# Patient Record
Sex: FEMALE | Race: WHITE | Employment: OTHER | URBAN - METROPOLITAN AREA
[De-identification: names, ages, dates, MRNs, and addresses within clinical notes are randomized per-mention and may not be internally consistent; named-entity substitution may affect disease eponyms.]

---

## 2017-05-10 ENCOUNTER — HOSPITAL ENCOUNTER (OUTPATIENT)
Dept: WOMENS IMAGING | Age: 82
Discharge: HOME | End: 2017-05-10
Payer: MEDICARE

## 2017-05-10 DIAGNOSIS — Z13.9 SCREENING: ICD-10-CM

## 2017-05-10 PROCEDURE — 77063 BREAST TOMOSYNTHESIS BI: CPT

## 2018-10-07 ENCOUNTER — OFFICE VISIT (OUTPATIENT)
Dept: FAMILY MEDICINE CLINIC | Age: 83
End: 2018-10-07
Payer: MEDICARE

## 2018-10-07 VITALS
TEMPERATURE: 97.5 F | BODY MASS INDEX: 22.2 KG/M2 | SYSTOLIC BLOOD PRESSURE: 132 MMHG | OXYGEN SATURATION: 94 % | HEIGHT: 61 IN | WEIGHT: 117.6 LBS | DIASTOLIC BLOOD PRESSURE: 78 MMHG | HEART RATE: 64 BPM

## 2018-10-07 DIAGNOSIS — S39.92XA INJURY OF BACK, INITIAL ENCOUNTER: Primary | ICD-10-CM

## 2018-10-07 DIAGNOSIS — S29.012A MUSCLE STRAIN OF UPPER BACK: ICD-10-CM

## 2018-10-07 PROCEDURE — 99213 OFFICE O/P EST LOW 20 MIN: CPT | Performed by: NURSE PRACTITIONER

## 2018-10-07 RX ORDER — TIZANIDINE 2 MG/1
2 TABLET ORAL EVERY 8 HOURS PRN
Qty: 15 TABLET | Refills: 0 | Status: SHIPPED | OUTPATIENT
Start: 2018-10-07 | End: 2018-10-12

## 2018-10-07 ASSESSMENT — ENCOUNTER SYMPTOMS
FACIAL SWELLING: 0
BACK PAIN: 1
SORE THROAT: 0
EYE REDNESS: 0
SINUS PRESSURE: 0
CHEST TIGHTNESS: 0
SHORTNESS OF BREATH: 0
EYE DISCHARGE: 0
WHEEZING: 0
NAUSEA: 0
CONSTIPATION: 0
ABDOMINAL DISTENTION: 0
ABDOMINAL PAIN: 0
DIARRHEA: 0
COUGH: 0

## 2018-10-07 ASSESSMENT — PATIENT HEALTH QUESTIONNAIRE - PHQ9
1. LITTLE INTEREST OR PLEASURE IN DOING THINGS: 0
2. FEELING DOWN, DEPRESSED OR HOPELESS: 0
SUM OF ALL RESPONSES TO PHQ9 QUESTIONS 1 & 2: 0
SUM OF ALL RESPONSES TO PHQ QUESTIONS 1-9: 0
SUM OF ALL RESPONSES TO PHQ QUESTIONS 1-9: 0

## 2018-10-07 NOTE — PROGRESS NOTES
and back pain. Negative for gait problem and joint swelling. Skin: Negative. Neurological: Negative for dizziness, seizures, syncope, weakness, numbness and headaches. Psychiatric/Behavioral: Negative for agitation, behavioral problems, confusion and dysphoric mood. Objective:   /78 (Site: Left Upper Arm, Position: Sitting, Cuff Size: Medium Adult)   Pulse 64   Temp 97.5 °F (36.4 °C) (Tympanic)   Ht 5' 1\" (1.549 m)   Wt 117 lb 9.6 oz (53.3 kg)   SpO2 94%   BMI 22.22 kg/m²     Physical Exam   Constitutional: She is oriented to person, place, and time. She appears well-developed. HENT:   Head: Normocephalic and atraumatic. Mouth/Throat: Oropharynx is clear and moist.   Eyes: Pupils are equal, round, and reactive to light. Neck: No tracheal deviation present. No thyromegaly present. Cardiovascular: Normal rate, regular rhythm and normal heart sounds. Exam reveals no gallop. No murmur heard. Pulmonary/Chest: Effort normal and breath sounds normal. She has no wheezes. She has no rales. She exhibits no tenderness. Abdominal: Soft. Bowel sounds are normal. She exhibits no distension and no mass. There is no tenderness. There is no rebound and no guarding. Musculoskeletal: Normal range of motion. She exhibits tenderness. She exhibits no edema. Arms:  Lymphadenopathy:     She has no cervical adenopathy. Neurological: She is alert and oriented to person, place, and time. Coordination normal.   Skin: Skin is warm and dry. No rash noted. No erythema. Psychiatric: Her behavior is normal. Thought content normal.       Assessment:       Diagnosis Orders   1. Injury of back, initial encounter  XR THORACIC SPINE (3 VIEWS)    tiZANidine (ZANAFLEX) 2 MG tablet   2.  Muscle strain of upper back  XR THORACIC SPINE (3 VIEWS)    tiZANidine (ZANAFLEX) 2 MG tablet       Plan:      Orders Placed This Encounter   Procedures    XR THORACIC SPINE (3 VIEWS)     Standing Status:   Future Standing Expiration Date:   10/12/2019     Order Specific Question:   Reason for exam:     Answer:   back injury/ fall       Orders Placed This Encounter   Medications    tiZANidine (ZANAFLEX) 2 MG tablet     Sig: Take 1 tablet by mouth every 8 hours as needed (muscle spasms)     Dispense:  15 tablet     Refill:  0       No Follow-up on file. Reviewed with the patient: current clinical status, medications, activities and diet. Side effects, adverse effects of the medication prescribed today, as well as treatment plan/ rationale and result expectations have been discussed with the patient who expresses understanding and desires to proceed. Close follow up to evaluate treatment results and for coordination of care. I have reviewed the patient's medical history in detail and updated the computerized patient record.     Zakip Peri, APRN - CNP

## 2020-01-08 ENCOUNTER — APPOINTMENT (OUTPATIENT)
Dept: GENERAL RADIOLOGY | Age: 85
End: 2020-01-08
Payer: MEDICARE

## 2020-01-08 ENCOUNTER — HOSPITAL ENCOUNTER (EMERGENCY)
Age: 85
Discharge: HOME | End: 2020-01-08
Payer: MEDICARE

## 2020-01-08 VITALS
WEIGHT: 122 LBS | OXYGEN SATURATION: 96 % | HEIGHT: 60 IN | HEART RATE: 85 BPM | BODY MASS INDEX: 23.95 KG/M2 | RESPIRATION RATE: 22 BRPM | SYSTOLIC BLOOD PRESSURE: 154 MMHG | TEMPERATURE: 97.8 F | DIASTOLIC BLOOD PRESSURE: 86 MMHG

## 2020-01-08 LAB
ALBUMIN SERPL-MCNC: 4.4 G/DL (ref 3.5–4.6)
ALP BLD-CCNC: 122 U/L (ref 40–130)
ALT SERPL-CCNC: 13 U/L (ref 0–33)
ANION GAP SERPL CALCULATED.3IONS-SCNC: 17 MEQ/L (ref 9–15)
AST SERPL-CCNC: 18 U/L (ref 0–35)
BASOPHILS ABSOLUTE: 0.1 K/UL (ref 0–0.2)
BASOPHILS RELATIVE PERCENT: 1.3 %
BILIRUB SERPL-MCNC: <0.2 MG/DL (ref 0.2–0.7)
BUN BLDV-MCNC: 28 MG/DL (ref 8–23)
CALCIUM SERPL-MCNC: 9.6 MG/DL (ref 8.5–9.9)
CHLORIDE BLD-SCNC: 100 MEQ/L (ref 95–107)
CO2: 25 MEQ/L (ref 20–31)
CREAT SERPL-MCNC: 1.17 MG/DL (ref 0.5–0.9)
EKG ATRIAL RATE: 95 BPM
EKG P AXIS: 18 DEGREES
EKG P-R INTERVAL: 206 MS
EKG Q-T INTERVAL: 350 MS
EKG QRS DURATION: 74 MS
EKG QTC CALCULATION (BAZETT): 439 MS
EKG R AXIS: -11 DEGREES
EKG T AXIS: -12 DEGREES
EKG VENTRICULAR RATE: 95 BPM
EOSINOPHILS ABSOLUTE: 0.2 K/UL (ref 0–0.7)
EOSINOPHILS RELATIVE PERCENT: 4.3 %
GFR AFRICAN AMERICAN: 52.5
GFR NON-AFRICAN AMERICAN: 43.4
GLOBULIN: 2.9 G/DL (ref 2.3–3.5)
GLUCOSE BLD-MCNC: 211 MG/DL (ref 70–99)
HCT VFR BLD CALC: 35.8 % (ref 37–47)
HEMOGLOBIN: 11.7 G/DL (ref 12–16)
LYMPHOCYTES ABSOLUTE: 1.2 K/UL (ref 1–4.8)
LYMPHOCYTES RELATIVE PERCENT: 22.8 %
MCH RBC QN AUTO: 30.5 PG (ref 27–31.3)
MCHC RBC AUTO-ENTMCNC: 32.8 % (ref 33–37)
MCV RBC AUTO: 93.1 FL (ref 82–100)
MONOCYTES ABSOLUTE: 0.3 K/UL (ref 0.2–0.8)
MONOCYTES RELATIVE PERCENT: 6.3 %
NEUTROPHILS ABSOLUTE: 3.4 K/UL (ref 1.4–6.5)
NEUTROPHILS RELATIVE PERCENT: 65.3 %
PDW BLD-RTO: 14.4 % (ref 11.5–14.5)
PLATELET # BLD: 160 K/UL (ref 130–400)
POTASSIUM SERPL-SCNC: 3.4 MEQ/L (ref 3.4–4.9)
PRO-BNP: 203 PG/ML
RBC # BLD: 3.85 M/UL (ref 4.2–5.4)
SODIUM BLD-SCNC: 142 MEQ/L (ref 135–144)
TOTAL CK: 110 U/L (ref 0–170)
TOTAL PROTEIN: 7.3 G/DL (ref 6.3–8)
TROPONIN: <0.01 NG/ML (ref 0–0.01)
WBC # BLD: 5.2 K/UL (ref 4.8–10.8)

## 2020-01-08 PROCEDURE — 83880 ASSAY OF NATRIURETIC PEPTIDE: CPT

## 2020-01-08 PROCEDURE — 36415 COLL VENOUS BLD VENIPUNCTURE: CPT

## 2020-01-08 PROCEDURE — 71045 X-RAY EXAM CHEST 1 VIEW: CPT

## 2020-01-08 PROCEDURE — 99284 EMERGENCY DEPT VISIT MOD MDM: CPT

## 2020-01-08 PROCEDURE — 80053 COMPREHEN METABOLIC PANEL: CPT

## 2020-01-08 PROCEDURE — 84484 ASSAY OF TROPONIN QUANT: CPT

## 2020-01-08 PROCEDURE — 93005 ELECTROCARDIOGRAM TRACING: CPT | Performed by: PHYSICIAN ASSISTANT

## 2020-01-08 PROCEDURE — 82550 ASSAY OF CK (CPK): CPT

## 2020-01-08 PROCEDURE — 85025 COMPLETE CBC W/AUTO DIFF WBC: CPT

## 2020-01-08 ASSESSMENT — ENCOUNTER SYMPTOMS
TROUBLE SWALLOWING: 0
SHORTNESS OF BREATH: 0
ABDOMINAL PAIN: 0
ALLERGIC/IMMUNOLOGIC NEGATIVE: 1
COLOR CHANGE: 0
APNEA: 0
BACK PAIN: 1
EYE PAIN: 0

## 2020-01-09 NOTE — ED TRIAGE NOTES
Pt states she had a sudden pain in her left shoulder/back and an elevated BP today, Pt denies trauma, Hx of HTN, Pt states the pain has since subsided, Pt is A&OX3, calm, ambulatory, afebrile, breathes are equal and unlabored, denies pain, SOB, dizziness, and N/V/D.

## 2020-01-09 NOTE — ED PROVIDER NOTES
[facility] ED  eMERGENCY dEPARTMENTeNCOUnter      Pt Name: Joshua Iraheta  MRN: [id number]  [place] 8/2/1929  Date ofevaluation: 1/8/2020  Provider: Lory East PA-C    CHIEF COMPLAINT       Chief Complaint   Patient presents with    Hypertension     pt c/o an elevated BP that started today, Hx of HTN         HISTORY OF PRESENT ILLNESS   (Location/Symptom, Timing/Onset,Context/Setting, Quality, Duration, Modifying Factors, Severity)  Note limiting factors. Joshua Iraheta is a 80 y.o. female who presents to the emergency department with complaints of elevated blood pressure and left scapular back pain. Patient states that symptoms began with an \"ache\" to the suprascapular region of the back on the left side this evening. Patient states that a friend of hers told her that this could be a sign of a heart attack so the patient checked her blood pressure and states that her blood pressure was over 333 systolic. Patient states that she was concerned so she came to the emergency department. Patient denies any chest pain or shortness of breath. Patient rates her back pain at a 1 out of 10. Patient did not take any medicines for this. Patient admits that she had a similar episode of this in October and was seen at a different emergency department \"everything was fine\". Patient denies any recent cough or congestion. Patient denies any known fevers    HPI    NursingNotes were reviewed. REVIEW OF SYSTEMS    (2-9 systems for level 4, 10 or more for level 5)     Review of Systems   Constitutional: Negative for diaphoresis and fever. HENT: Negative for hearing loss and trouble swallowing. Eyes: Negative for pain. Respiratory: Negative for apnea and shortness of breath. Cardiovascular: Negative for chest pain. Gastrointestinal: Negative for abdominal pain. Endocrine: Negative. Genitourinary: Negative for hematuria. Musculoskeletal: Positive for back pain.  Negative for neck pain clubs or organizations: None     Relationship status: None    Intimate partner violence:     Fear of current or ex partner: None     Emotionally abused: None     Physically abused: None     Forced sexual activity: None   Other Topics Concern    None   Social History Narrative    None       SCREENINGS    [place] Coma Scale  Eye Opening: Spontaneous  Best Verbal Response: Oriented  Best Motor Response: Obeys commands  [place] Coma Scale Score: 15         PHYSICAL EXAM    (up to 7 for level 4, 8 or more for level 5)     ED Triage Vitals   BP Temp Temp Source Pulse Resp SpO2 Height Weight   01/08/20 2104 01/08/20 2103 01/08/20 2103 01/08/20 2103 01/08/20 2103 01/08/20 2103 01/08/20 2103 01/08/20 2103   (!) 199/108 97.8 °F (36.6 °C) Oral 111 18 96 % 5' (1.524 m) 122 lb (55.3 kg)       Physical Exam  Vitals signs and nursing note reviewed. Constitutional:       General: She is not in acute distress. Appearance: She is well-developed. She is not diaphoretic. HENT:      Head: Normocephalic and atraumatic. Mouth/Throat:      Pharynx: No oropharyngeal exudate. Eyes:      General: No scleral icterus. Conjunctiva/sclera: Conjunctivae normal.      Pupils: Pupils are equal, round, and reactive to light. Neck:      Musculoskeletal: Normal range of motion and neck supple. Trachea: No tracheal deviation. Cardiovascular:      Rate and Rhythm: Normal rate. Heart sounds: Normal heart sounds. Pulmonary:      Effort: Pulmonary effort is normal. No respiratory distress. Breath sounds: Normal breath sounds. Abdominal:      General: Bowel sounds are normal. There is no distension. Palpations: Abdomen is soft. Musculoskeletal: Normal range of motion. Skin:     General: Skin is warm and dry. Findings: No erythema or rash. Neurological:      Mental Status: She is alert and oriented to person, place, and time. Cranial Nerves: No cranial nerve deficit.       Motor: No abnormal

## 2020-01-10 PROCEDURE — 93010 ELECTROCARDIOGRAM REPORT: CPT | Performed by: INTERNAL MEDICINE

## 2020-04-06 ENCOUNTER — VIRTUAL VISIT (OUTPATIENT)
Dept: GASTROENTEROLOGY | Age: 85
End: 2020-04-06
Payer: MEDICARE

## 2020-04-06 PROCEDURE — 99443 PR PHYS/QHP TELEPHONE EVALUATION 21-30 MIN: CPT | Performed by: SPECIALIST

## 2020-04-06 RX ORDER — FAMOTIDINE 20 MG/1
20 TABLET, FILM COATED ORAL ONCE
Qty: 60 TABLET | Refills: 2 | Status: SHIPPED | OUTPATIENT
Start: 2020-04-06 | End: 2020-04-06

## 2020-04-06 ASSESSMENT — ENCOUNTER SYMPTOMS
BLOOD IN STOOL: 0
NAUSEA: 0
ABDOMINAL DISTENTION: 0
RECTAL PAIN: 0
RESPIRATORY NEGATIVE: 1
ANAL BLEEDING: 0
CONSTIPATION: 0
ABDOMINAL PAIN: 0
VOMITING: 0
GASTROINTESTINAL NEGATIVE: 1
EYES NEGATIVE: 1
DIARRHEA: 0

## 2020-04-06 NOTE — PROGRESS NOTES
CHOLECYSTECTOMY      COLONOSCOPY      ENDOSCOPY, COLON, DIAGNOSTIC      EYE SURGERY Right     cataract    HERNIA REPAIR Right     HYSTERECTOMY      JOINT REPLACEMENT Right     knee replacement    LAPAROSCOPY SURGICAL W/ VAGINAL HYSTERECTOMY      RIGHT COLECTOMY      2003     UPPER GASTROINTESTINAL ENDOSCOPY N/A 12/20/2016    EGD ESOPHAGOGASTRODUODENOSCOPY WITH DILATION performed by Geovanni Claire MD at [facility]     Current Outpatient Medications on File Prior to Visit   Medication Sig Dispense Refill    Lactobacillus (ACIDOPHILUS PO) Take by mouth      losartan (COZAAR) 50 MG tablet Take 1 tablet by mouth daily 90 tablet 2    aspirin EC 81 MG EC tablet take 1 tablet by mouth daily  0    omeprazole (PRILOSEC) 20 MG delayed release capsule Take 20 mg by mouth daily      triamterene-hydrochlorothiazide (MAXZIDE) 75-50 MG per tablet TAKE ONE - HALF (1/2) TABLET BY MOUTH ONE TIME DAILY FOR BLOOD PRESSURE 45 tablet 1    pravastatin (PRAVACHOL) 10 MG tablet Take 1 tablet by mouth daily at bedtime for cholesterol 90 tablet 3    carvedilol (COREG) 12.5 MG tablet TAKE 1 TABLET BY MOUTH TWICE DAILY (Patient not taking: Reported on 4/6/2020) 180 tablet 3    metFORMIN (GLUCOPHAGE) 500 MG tablet TAKE 1 TABLET BY MOUTH TWICE DAILY FOR DIABETES 180 tablet 3    ONE TOUCH ULTRASOFT LANCETS MISC USE TO TEST BLOOD SUGAR 1 TIME DAILY AS NEEDED FOR DIABETES 100 each 5    glucose blood VI test strips (ONE TOUCH ULTRA TEST) strip Use to test blood sugar 1 time daily 100 each 6    cyanocobalamin 1000 MCG tablet TAKE 1 TABLET BY MOUTH DAILY FOR B-12 DEFICIENCY 100 3    Blood Glucose Monitoring Suppl MISC USE AS DIRECTED BY PHYSICIAN 100 3     No current facility-administered medications on file prior to visit. Family History   Problem Relation Age of Onset    Pancreatic Cancer Mother       Social History     Socioeconomic History    Marital status:       Spouse name: Not on file    Number of children: Not on file    Years of education: Not on file    Highest education level: Not on file   Occupational History    Not on file   Social Needs    Financial resource strain: Not on file    Food insecurity     Worry: Not on file     Inability: Not on file    Transportation needs     Medical: Not on file     Non-medical: Not on file   Tobacco Use    Smoking status: Never Smoker    Smokeless tobacco: Never Used   Substance and Sexual Activity    Alcohol use: No     Alcohol/week: 0.0 standard drinks    Drug use: No    Sexual activity: Not on file   Lifestyle    Physical activity     Days per week: Not on file     Minutes per session: Not on file    Stress: Not on file   Relationships    Social connections     Talks on phone: Not on file     Gets together: Not on file     Attends Holiness service: Not on file     Active member of club or organization: Not on file     Attends meetings of clubs or organizations: Not on file     Relationship status: Not on file    Intimate partner violence     Fear of current or ex partner: Not on file     Emotionally abused: Not on file     Physically abused: Not on file     Forced sexual activity: Not on file   Other Topics Concern    Not on file   Social History Narrative    Not on file       not currently breastfeeding. Physical Exam    Laboratory, Pathology, Radiology reviewed indetail with relevant important investigations summarized below:  Lab Results   Component Value Date    WBC 5.2 01/08/2020    HGB 11.7 (L) 01/08/2020    HCT 35.8 (L) 01/08/2020    MCV 93.1 01/08/2020     01/08/2020     Lab Results   Component Value Date    ALT 13 01/08/2020    AST 18 01/08/2020    ALKPHOS 122 01/08/2020    BILITOT <0.2 01/08/2020       No results found. Endoscopic investigations:     Assessmentand Plan:  80 y.o. female with history of chronic GERD. Has mostly nocturnal symptoms and regurgitation. Patient has been on omeprazole 20 mg once a day.   Will add Pepcid 20 mg at p.m. Patient to return after 6 weeks   Diagnosis Orders   1. Gastroesophageal reflux disease without esophagitis       Return in about 6 weeks (around 5/18/2020). Krzysztof Bucio MD   Staff Gastroenterologist  [facility]    Please note this report has been partially produced using speech recognition software and may cause contain errors related to thatsystem including grammar, punctuation and spelling as well as words and phrases that may seem inappropriate. If there are questions or concerns please feel free to contact me to clarify.

## 2020-06-02 ENCOUNTER — TELEPHONE (OUTPATIENT)
Dept: GASTROENTEROLOGY | Age: 85
End: 2020-06-02

## 2020-06-03 ENCOUNTER — NURSE ONLY (OUTPATIENT)
Dept: PRIMARY CARE CLINIC | Age: 85
End: 2020-06-03

## 2020-06-03 LAB — SARS-COV-2, NAAT: NOT DETECTED

## 2020-06-03 PROCEDURE — U0002 COVID-19 LAB TEST NON-CDC: HCPCS

## 2020-06-04 ENCOUNTER — ANESTHESIA EVENT (OUTPATIENT)
Dept: ENDOSCOPY | Age: 85
End: 2020-06-04
Payer: MEDICARE

## 2020-06-04 ENCOUNTER — ANCILLARY PROCEDURE (OUTPATIENT)
Dept: ENDOSCOPY | Age: 85
End: 2020-06-04
Attending: SPECIALIST
Payer: MEDICARE

## 2020-06-04 ENCOUNTER — ANESTHESIA (OUTPATIENT)
Dept: ENDOSCOPY | Age: 85
End: 2020-06-04
Payer: MEDICARE

## 2020-06-04 ENCOUNTER — HOSPITAL ENCOUNTER (OUTPATIENT)
Age: 85
Setting detail: OUTPATIENT SURGERY
Discharge: HOME | End: 2020-06-04
Attending: SPECIALIST | Admitting: SPECIALIST
Payer: MEDICARE

## 2020-06-04 VITALS
OXYGEN SATURATION: 97 % | TEMPERATURE: 98.4 F | HEART RATE: 53 BPM | HEIGHT: 60 IN | RESPIRATION RATE: 18 BRPM | DIASTOLIC BLOOD PRESSURE: 58 MMHG | WEIGHT: 118 LBS | SYSTOLIC BLOOD PRESSURE: 144 MMHG | BODY MASS INDEX: 23.16 KG/M2

## 2020-06-04 VITALS
RESPIRATION RATE: 22 BRPM | DIASTOLIC BLOOD PRESSURE: 84 MMHG | OXYGEN SATURATION: 98 % | SYSTOLIC BLOOD PRESSURE: 176 MMHG

## 2020-06-04 LAB
GLUCOSE BLD-MCNC: 128 MG/DL (ref 60–115)
PERFORMED ON: ABNORMAL

## 2020-06-04 PROCEDURE — 6360000002 HC RX W HCPCS: Performed by: NURSE ANESTHETIST, CERTIFIED REGISTERED

## 2020-06-04 PROCEDURE — 43248 EGD GUIDE WIRE INSERTION: CPT | Performed by: SPECIALIST

## 2020-06-04 PROCEDURE — 3609017100 HC EGD: Performed by: SPECIALIST

## 2020-06-04 PROCEDURE — 7100000011 HC PHASE II RECOVERY - ADDTL 15 MIN: Performed by: SPECIALIST

## 2020-06-04 PROCEDURE — 2580000003 HC RX 258

## 2020-06-04 PROCEDURE — 3700000000 HC ANESTHESIA ATTENDED CARE: Performed by: SPECIALIST

## 2020-06-04 PROCEDURE — 7100000010 HC PHASE II RECOVERY - FIRST 15 MIN: Performed by: SPECIALIST

## 2020-06-04 PROCEDURE — 2500000003 HC RX 250 WO HCPCS: Performed by: NURSE ANESTHETIST, CERTIFIED REGISTERED

## 2020-06-04 PROCEDURE — 2709999900 HC NON-CHARGEABLE SUPPLY: Performed by: SPECIALIST

## 2020-06-04 PROCEDURE — 2580000003 HC RX 258: Performed by: SPECIALIST

## 2020-06-04 RX ORDER — LIDOCAINE HYDROCHLORIDE 20 MG/ML
INJECTION, SOLUTION EPIDURAL; INFILTRATION; INTRACAUDAL; PERINEURAL PRN
Status: DISCONTINUED | OUTPATIENT
Start: 2020-06-04 | End: 2020-06-04 | Stop reason: SDUPTHER

## 2020-06-04 RX ORDER — MAGNESIUM HYDROXIDE 1200 MG/15ML
LIQUID ORAL PRN
Status: DISCONTINUED | OUTPATIENT
Start: 2020-06-04 | End: 2020-06-04 | Stop reason: ALTCHOICE

## 2020-06-04 RX ORDER — POTASSIUM CHLORIDE 20 MEQ/1
20 TABLET, EXTENDED RELEASE ORAL 2 TIMES DAILY
COMMUNITY

## 2020-06-04 RX ORDER — SODIUM CHLORIDE 9 MG/ML
INJECTION, SOLUTION INTRAVENOUS
Status: COMPLETED
Start: 2020-06-04 | End: 2020-06-04

## 2020-06-04 RX ORDER — SODIUM CHLORIDE 9 MG/ML
INJECTION, SOLUTION INTRAVENOUS CONTINUOUS
Status: DISCONTINUED | OUTPATIENT
Start: 2020-06-04 | End: 2020-06-04 | Stop reason: HOSPADM

## 2020-06-04 RX ORDER — EZETIMIBE 10 MG/1
10 TABLET ORAL DAILY
COMMUNITY

## 2020-06-04 RX ORDER — GLIPIZIDE 2.5 MG/1
2.5 TABLET, EXTENDED RELEASE ORAL DAILY
COMMUNITY

## 2020-06-04 RX ORDER — ALLOPURINOL 100 MG/1
100 TABLET ORAL DAILY
COMMUNITY

## 2020-06-04 RX ORDER — PROPOFOL 10 MG/ML
INJECTION, EMULSION INTRAVENOUS PRN
Status: DISCONTINUED | OUTPATIENT
Start: 2020-06-04 | End: 2020-06-04 | Stop reason: SDUPTHER

## 2020-06-04 RX ORDER — TORSEMIDE 20 MG/1
20 TABLET ORAL DAILY
COMMUNITY

## 2020-06-04 RX ADMIN — PROPOFOL 120 MG: 10 INJECTION, EMULSION INTRAVENOUS at 10:50

## 2020-06-04 RX ADMIN — SODIUM CHLORIDE: 9 INJECTION, SOLUTION INTRAVENOUS at 09:27

## 2020-06-04 RX ADMIN — LIDOCAINE HYDROCHLORIDE 50 MG: 20 INJECTION, SOLUTION EPIDURAL; INFILTRATION; INTRACAUDAL; PERINEURAL at 10:50

## 2020-06-04 NOTE — ANESTHESIA PRE PROCEDURE
Historical Provider       Current medications:    No current facility-administered medications for this encounter. Allergies: Allergies   Allergen Reactions    Atenolol Other (See Comments)     Fatigue, bradycardia    Lisinopril Other (See Comments)     Cough    Lovastatin Other (See Comments)     Myalgias    Naproxen Other (See Comments)     Dyspepsia    Atorvastatin      Leg cramps       Problem List:    Patient Active Problem List   Diagnosis Code    Esophageal stricture K22.2    GERD (gastroesophageal reflux disease) K21.9    Hx of total knee arthroplasty Z96.659    Senile cataracts of both eyes H25.9    Ptosis of both eyelids H02.403    Disc degeneration, lumbar M51.36    HTN (hypertension), benign I10    Hyperlipidemia E78.5    Anxiety disorder F41.9    Vitamin B12 deficiency E53.8    Type 2 diabetes mellitus without complication (HCC) P59.5    Gastroesophageal reflux disease without esophagitis K21.9    Squamous cell carcinoma of scalp C44.42       Past Medical History:        Diagnosis Date    Cataract     1/2/2007     Diabetes mellitus ([address]     Esophageal stricture     6/29/2004  : SCHATZKI'S RING/STRICTURE    Hx of intestinal bypass     8/29/2000  : S/P RT HEMICOLECTOMY    Hyperlipidemia     Hypertension     Incarcerated inguinal hernia     5/7/2008  : Repair 2/29/08 in [facility].     Schatzki's ring      : W/ ESOPHAGEAL STRICTURE       Past Surgical History:        Procedure Laterality Date    CHOLECYSTECTOMY      COLONOSCOPY      ENDOSCOPY, COLON, DIAGNOSTIC      EYE SURGERY Right     cataract    HERNIA REPAIR Right     HYSTERECTOMY      JOINT REPLACEMENT Right     knee replacement    LAPAROSCOPY SURGICAL W/ VAGINAL HYSTERECTOMY      RIGHT COLECTOMY      2003     UPPER GASTROINTESTINAL ENDOSCOPY N/A 12/20/2016    EGD ESOPHAGOGASTRODUODENOSCOPY WITH DILATION performed by Jairo Beltran MD at [address] History:    Social History     Tobacco Use Component Value Date    COVID19 Not Detected 06/03/2020         Anesthesia Evaluation  Patient summary reviewed and Nursing notes reviewed  Airway: Mallampati: II  TM distance: >3 FB   Neck ROM: full  Mouth opening: > = 3 FB Dental: normal exam         Pulmonary:Negative Pulmonary ROS and normal exam                               Cardiovascular:    (+) hypertension:, hyperlipidemia      ECG reviewed      Echocardiogram reviewed  Stress test reviewed       Beta Blocker:  Dose within 24 Hrs      ROS comment: Normal sinus rhythm  Normal ECG  No previous ECGs available  Confirmed by Lance Joseph ([id number]) on 1/10/2020 5:24:46 PM    ECHO LVEF 55-60%     Neuro/Psych:   (+) psychiatric history:depression/anxiety             GI/Hepatic/Renal:   (+) GERD:,           Endo/Other:    (+) DiabetesType II DM, , .                 Abdominal:           Vascular: negative vascular ROS. Anesthesia Plan      MAC     ASA 2       Induction: intravenous. Anesthetic plan and risks discussed with patient. Plan discussed with attending.                 Geovanna Esteban, DERIK - CRNA   6/4/2020

## 2021-02-09 ENCOUNTER — OFFICE VISIT (OUTPATIENT)
Dept: GASTROENTEROLOGY | Age: 86
End: 2021-02-09
Payer: MEDICARE

## 2021-02-09 VITALS
OXYGEN SATURATION: 98 % | BODY MASS INDEX: 22.97 KG/M2 | WEIGHT: 117 LBS | RESPIRATION RATE: 16 BRPM | HEIGHT: 60 IN | HEART RATE: 94 BPM

## 2021-02-09 DIAGNOSIS — R13.19 ESOPHAGEAL DYSPHAGIA: Primary | ICD-10-CM

## 2021-02-09 PROCEDURE — 99203 OFFICE O/P NEW LOW 30 MIN: CPT | Performed by: SPECIALIST

## 2021-02-09 PROCEDURE — 1090F PRES/ABSN URINE INCON ASSESS: CPT | Performed by: SPECIALIST

## 2021-02-09 PROCEDURE — 1123F ACP DISCUSS/DSCN MKR DOCD: CPT | Performed by: SPECIALIST

## 2021-02-09 PROCEDURE — G8427 DOCREV CUR MEDS BY ELIG CLIN: HCPCS | Performed by: SPECIALIST

## 2021-02-09 PROCEDURE — 1036F TOBACCO NON-USER: CPT | Performed by: SPECIALIST

## 2021-02-09 PROCEDURE — G8420 CALC BMI NORM PARAMETERS: HCPCS | Performed by: SPECIALIST

## 2021-02-09 PROCEDURE — 4040F PNEUMOC VAC/ADMIN/RCVD: CPT | Performed by: SPECIALIST

## 2021-02-09 PROCEDURE — G8484 FLU IMMUNIZE NO ADMIN: HCPCS | Performed by: SPECIALIST

## 2021-02-09 ASSESSMENT — ENCOUNTER SYMPTOMS
NAUSEA: 0
EYES NEGATIVE: 1
ABDOMINAL PAIN: 0
RECTAL PAIN: 0
GASTROINTESTINAL NEGATIVE: 1
BLOOD IN STOOL: 0
VOMITING: 0
DIARRHEA: 0
ABDOMINAL DISTENTION: 0
ANAL BLEEDING: 0
RESPIRATORY NEGATIVE: 1
CONSTIPATION: 0

## 2021-02-09 NOTE — PROGRESS NOTES
SURGERY Right     cataract    HERNIA REPAIR Right     HYSTERECTOMY      JOINT REPLACEMENT Right     knee replacement    LAPAROSCOPY SURGICAL W/ VAGINAL HYSTERECTOMY      RIGHT COLECTOMY      2003     UPPER GASTROINTESTINAL ENDOSCOPY N/A 12/20/2016    EGD ESOPHAGOGASTRODUODENOSCOPY WITH DILATION performed by Baldwin Cheadle, MD at [place] [id number] 6/4/2020    EGD DIAGNOSTIC ONLY performed by Baldwin Cheadle, MD at [facility]     Current Outpatient Medications on File Prior to Visit   Medication Sig Dispense Refill    torsemide (DEMADEX) 20 MG tablet Take 20 mg by mouth daily      potassium chloride (KLOR-CON M) 20 MEQ extended release tablet Take 20 mEq by mouth 2 times daily      allopurinol (ZYLOPRIM) 100 MG tablet Take 100 mg by mouth daily      ezetimibe (ZETIA) 10 MG tablet Take 10 mg by mouth daily      glipiZIDE (GLUCOTROL XL) 2.5 MG extended release tablet Take 2.5 mg by mouth daily      omeprazole (PRILOSEC) 20 MG delayed release capsule Take 20 mg by mouth daily      Lactobacillus (ACIDOPHILUS PO) Take by mouth      losartan (COZAAR) 50 MG tablet Take 1 tablet by mouth daily 90 tablet 2    metFORMIN (GLUCOPHAGE) 500 MG tablet TAKE 1 TABLET BY MOUTH TWICE DAILY FOR DIABETES 180 tablet 3    ONE TOUCH ULTRASOFT LANCETS MISC USE TO TEST BLOOD SUGAR 1 TIME DAILY AS NEEDED FOR DIABETES 100 each 5    aspirin EC 81 MG EC tablet take 1 tablet by mouth daily  0    famotidine (PEPCID) 20 MG tablet Take 1 tablet by mouth once for 1 dose 60 tablet 2    pravastatin (PRAVACHOL) 10 MG tablet Take 1 tablet by mouth daily at bedtime for cholesterol 90 tablet 3    carvedilol (COREG) 12.5 MG tablet TAKE 1 TABLET BY MOUTH TWICE DAILY (Patient not taking: Reported on 4/6/2020) 180 tablet 3    glucose blood VI test strips (ONE TOUCH ULTRA TEST) strip Use to test blood sugar 1 time daily 100 each 6    cyanocobalamin 1000 MCG tablet TAKE 1 TABLET BY MOUTH DAILY FOR B-12 DEFICIENCY 100 3    Blood Glucose Monitoring Suppl MISC USE AS DIRECTED BY PHYSICIAN 100 3     No current facility-administered medications on file prior to visit. Family History   Problem Relation Age of Onset    Pancreatic Cancer Mother       Social History     Socioeconomic History    Marital status:      Spouse name: None    Number of children: None    Years of education: None    Highest education level: None   Occupational History    None   Social Needs    Financial resource strain: None    Food insecurity     Worry: None     Inability: None    Transportation needs     Medical: None     Non-medical: None   Tobacco Use    Smoking status: Never Smoker    Smokeless tobacco: Never Used   Substance and Sexual Activity    Alcohol use: No     Alcohol/week: 0.0 standard drinks    Drug use: No    Sexual activity: None   Lifestyle    Physical activity     Days per week: None     Minutes per session: None    Stress: None   Relationships    Social connections     Talks on phone: None     Gets together: None     Attends Rastafari service: None     Active member of club or organization: None     Attends meetings of clubs or organizations: None     Relationship status: None    Intimate partner violence     Fear of current or ex partner: None     Emotionally abused: None     Physically abused: None     Forced sexual activity: None   Other Topics Concern    None   Social History Narrative    None       Pulse 94, resp. rate 16, height 5' (1.524 m), weight 117 lb (53.1 kg), SpO2 98 %, not currently breastfeeding. Physical Exam  Constitutional:       Appearance: She is well-developed. HENT:      Head: Normocephalic and atraumatic. Eyes:      Conjunctiva/sclera: Conjunctivae normal.      Pupils: Pupils are equal, round, and reactive to light. Neck:      Musculoskeletal: Normal range of motion. Cardiovascular:      Rate and Rhythm: Normal rate.       Comments: S1-S2 regular with a systolic murmur  Pulmonary:      Effort: Pulmonary effort is normal.   Abdominal:      General: Bowel sounds are normal.      Palpations: Abdomen is soft. Comments: Soft nontender surgical scar seen   Musculoskeletal: Normal range of motion. Skin:     General: Skin is warm. Neurological:      Mental Status: She is alert. Laboratory, Pathology, Radiology reviewed indetail with relevant important investigations summarized below:  Lab Results   Component Value Date    WBC 5.2 01/08/2020    HGB 11.7 (L) 01/08/2020    HCT 35.8 (L) 01/08/2020    MCV 93.1 01/08/2020     01/08/2020     Lab Results   Component Value Date    ALT 13 01/08/2020    AST 18 01/08/2020    ALKPHOS 122 01/08/2020    BILITOT <0.2 01/08/2020       No results found. Endoscopic investigations:     Assessmentand Plan:  80 y.o. female with history of dysphagia predominantly for solids, had a mucosal ring which was dilated in the past, barium swallow to rule out any obvious motility abnormality or any mild stricture and may consider EGD depending on the outcome of the barium esophagogram.,  Return after 3 weeks   Diagnosis Orders   1. Esophageal dysphagia  FL ESOPHAGRAM     Return in about 3 weeks (around 3/2/2021). Cheyenne Harley MD   Staff Gastroenterologist  [facility]    Please note this report has been partially produced using speech recognition software and may cause contain errors related to thatsystem including grammar, punctuation and spelling as well as words and phrases that may seem inappropriate. If there are questions or concerns please feel free to contact me to clarify.

## 2021-02-22 ENCOUNTER — HOSPITAL ENCOUNTER (OUTPATIENT)
Dept: GENERAL RADIOLOGY | Age: 86
Discharge: HOME | End: 2021-02-24
Payer: MEDICARE

## 2021-02-22 DIAGNOSIS — R13.19 ESOPHAGEAL DYSPHAGIA: ICD-10-CM

## 2021-02-22 PROCEDURE — 6370000000 HC RX 637 (ALT 250 FOR IP): Performed by: SPECIALIST

## 2021-02-22 PROCEDURE — 74220 X-RAY XM ESOPHAGUS 1CNTRST: CPT

## 2021-02-22 PROCEDURE — 2500000003 HC RX 250 WO HCPCS: Performed by: SPECIALIST

## 2021-02-22 RX ADMIN — BARIUM SULFATE 176 G: 960 POWDER, FOR SUSPENSION ORAL at 11:33

## 2021-02-22 RX ADMIN — BARIUM SULFATE 140 ML: 980 POWDER, FOR SUSPENSION ORAL at 11:33

## 2021-02-22 RX ADMIN — ANTACID/ANTIFLATULENT 1 EACH: 380; 550; 10; 10 GRANULE, EFFERVESCENT ORAL at 11:34

## 2021-03-05 ENCOUNTER — OFFICE VISIT (OUTPATIENT)
Dept: GASTROENTEROLOGY | Age: 86
End: 2021-03-05
Payer: MEDICARE

## 2021-03-05 VITALS — HEIGHT: 60 IN | RESPIRATION RATE: 16 BRPM | BODY MASS INDEX: 23.56 KG/M2 | WEIGHT: 120 LBS

## 2021-03-05 DIAGNOSIS — R13.19 ESOPHAGEAL DYSPHAGIA: Primary | ICD-10-CM

## 2021-03-05 PROCEDURE — 1090F PRES/ABSN URINE INCON ASSESS: CPT | Performed by: SPECIALIST

## 2021-03-05 PROCEDURE — 4040F PNEUMOC VAC/ADMIN/RCVD: CPT | Performed by: SPECIALIST

## 2021-03-05 PROCEDURE — G8484 FLU IMMUNIZE NO ADMIN: HCPCS | Performed by: SPECIALIST

## 2021-03-05 PROCEDURE — 1036F TOBACCO NON-USER: CPT | Performed by: SPECIALIST

## 2021-03-05 PROCEDURE — G8420 CALC BMI NORM PARAMETERS: HCPCS | Performed by: SPECIALIST

## 2021-03-05 PROCEDURE — 1123F ACP DISCUSS/DSCN MKR DOCD: CPT | Performed by: SPECIALIST

## 2021-03-05 PROCEDURE — 99212 OFFICE O/P EST SF 10 MIN: CPT | Performed by: SPECIALIST

## 2021-03-05 PROCEDURE — G8427 DOCREV CUR MEDS BY ELIG CLIN: HCPCS | Performed by: SPECIALIST

## 2021-03-05 ASSESSMENT — ENCOUNTER SYMPTOMS
BLOOD IN STOOL: 0
ABDOMINAL DISTENTION: 0
NAUSEA: 0
EYES NEGATIVE: 1
RECTAL PAIN: 0
RESPIRATORY NEGATIVE: 1
CONSTIPATION: 0
ABDOMINAL PAIN: 0
DIARRHEA: 0
VOMITING: 0
ANAL BLEEDING: 0
GASTROINTESTINAL NEGATIVE: 1

## 2021-03-05 NOTE — PROGRESS NOTES
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 Patient reports occasional dysphagia probably once or twice a week especially when she tries to eat the bigger piece of meat. History of few pound weight loss. No abdominal pain no nausea or vomiting. Review of Systems   Constitutional: Negative. HENT: Negative. Eyes: Negative. Respiratory: Negative. Cardiovascular: Negative. Gastrointestinal: Negative. Negative for abdominal distention, abdominal pain, anal bleeding, blood in stool, constipation, diarrhea, nausea, rectal pain and vomiting. History of occasional dysphagia   Endocrine: Negative. Genitourinary: Negative. Musculoskeletal: Negative. Skin: Negative. Allergic/Immunologic: Negative for food allergies. Neurological: Negative. Hematological: Negative. Psychiatric/Behavioral: Negative. Past medical history, past surgical history, medication list, social and familyhistory reviewed    Resp. rate 16, height 5' (1.524 m), weight 120 lb (54.4 kg), not currently breastfeeding. Physical Exam  Constitutional:       Appearance: She is well-developed. HENT:      Head: Normocephalic and atraumatic. Comments:  no palpable mass  Eyes:      Conjunctiva/sclera: Conjunctivae normal.      Pupils: Pupils are equal, round, and reactive to light. Neck:      Musculoskeletal: Normal range of motion. Cardiovascular:      Rate and Rhythm: Normal rate. Pulmonary:      Effort: Pulmonary effort is normal.   Abdominal:      General: Bowel sounds are normal.      Palpations: Abdomen is soft.    Musculoskeletal: Normal range of motion. Skin:     General: Skin is warm. Neurological:      Mental Status: She is alert. Laboratory, Pathology, Radiology reviewed in detail with relevantimportant investigations summarized below:    No results for input(s): WBC, HGB, HCT, MCV, PLT in the last 720 hours. Lab Results   Component Value Date    ALT 13 01/08/2020    AST 18 01/08/2020    ALKPHOS 122 01/08/2020    BILITOT <0.2 01/08/2020     Fl Esophagram    Result Date: 2/22/2021  COMPARISON: No prior HISTORY:   R13.10 Esophageal dysphagia ICD10 PATIENT NAME: MAYUR GR: TECHNIQUE: FL ESOPHAGRAM 3 x-rays were performed and multiple fluoroscopic images were obtained. Fluoroscopic time: 1.5 minutes FINDINGS: Initially the patient was given a barium tablet to swallow. The patient was able to swallow the tablet with no difficulty. Initially the tablets stopped at the gastroesophageal junction. A hiatal hernia and Schatzki's ring were seen. A prominent cricopharyngeus muscle was identified. Also in the upper esophagus at about the C7-T1 level a diverticulum was identified anteriorly. It measured approximately 5 mm transverse at the opening and is about 12 mm in height. Multiple tertiary waves were identified in the esophagus. This resulted in delay in emptying. Also spontaneous gastroesophageal reflux was demonstrated. There was no aspiration demonstrated on this examination. 1. Anterior diverticulum also known as [place] diverticulum 2. Presbyesophagus 3. Hiatal hernia 4. Moderate gastroesophageal reflux      Endoscopic investigations:     Assessment and Plan:  Remona Cornelius 80 y.o. female for follow up. History of occasional solid food dysphagia and barium esophagram showed a small diverticulum in the proximal esophagus. Also has hiatal hernia and presbyesophagus. None of referral to ENT was discussed.   Time patient does not want to surgical referral since her symptoms are mild and considering her age she wants to wait and see, patient is on omeprazole 20 mg once a day and advised to continue that, the hiatal hernia and symptoms of GERD. Return as needed. Diagnosis Orders   1. Esophageal dysphagia         Return if symptoms worsen or fail to improve. Mercedez Last MD   StaffGastroenterologist  [facility]    Please note this report has been partially produced using speech recognitionsoftware  and may cause contain errors related to that system including grammar, punctuation and spelling as well as words andphrases that may seem inappropriate. If there are questions or concerns please feel free to contact me to clarify.

## 2021-07-26 ENCOUNTER — HOSPITAL ENCOUNTER (EMERGENCY)
Age: 86
Discharge: HOME | End: 2021-07-26
Attending: EMERGENCY MEDICINE
Payer: MEDICARE

## 2021-07-26 VITALS
WEIGHT: 130 LBS | DIASTOLIC BLOOD PRESSURE: 83 MMHG | BODY MASS INDEX: 23.92 KG/M2 | OXYGEN SATURATION: 100 % | TEMPERATURE: 97.7 F | HEIGHT: 62 IN | HEART RATE: 74 BPM | SYSTOLIC BLOOD PRESSURE: 184 MMHG | RESPIRATION RATE: 20 BRPM

## 2021-07-26 DIAGNOSIS — I10 ESSENTIAL HYPERTENSION: Primary | ICD-10-CM

## 2021-07-26 PROCEDURE — 6370000000 HC RX 637 (ALT 250 FOR IP): Performed by: EMERGENCY MEDICINE

## 2021-07-26 PROCEDURE — 99284 EMERGENCY DEPT VISIT MOD MDM: CPT

## 2021-07-26 RX ORDER — LOSARTAN POTASSIUM 25 MG/1
25 TABLET ORAL DAILY
Status: DISCONTINUED | OUTPATIENT
Start: 2021-07-26 | End: 2021-07-26 | Stop reason: HOSPADM

## 2021-07-26 RX ORDER — TORSEMIDE 20 MG/1
20 TABLET ORAL DAILY
Status: DISCONTINUED | OUTPATIENT
Start: 2021-07-26 | End: 2021-07-26 | Stop reason: HOSPADM

## 2021-07-26 RX ADMIN — LOSARTAN POTASSIUM 25 MG: 25 TABLET, FILM COATED ORAL at 17:12

## 2021-07-26 RX ADMIN — TORSEMIDE 20 MG: 20 TABLET ORAL at 17:12

## 2021-07-26 ASSESSMENT — ENCOUNTER SYMPTOMS
EYE DISCHARGE: 0
PHOTOPHOBIA: 0
ABDOMINAL DISTENTION: 0
WHEEZING: 0
ABDOMINAL PAIN: 0
VOMITING: 0
RHINORRHEA: 0
COLOR CHANGE: 0
SHORTNESS OF BREATH: 0
FACIAL SWELLING: 0

## 2021-07-26 NOTE — ED PROVIDER NOTES
[facility] [facility] ED  eMERGENCY dEPARTMENT eNCOUnter      Pt Name: Joss Munoz  MRN: [id number]  [place] 8/2/1929  Date of evaluation: 7/26/2021  Provider: Benita Ty, [address] [place] [address]       Chief Complaint   Patient presents with    Hypertension     went to [facility] urgent care for skin issue on left forearm. HISTORY OF PRESENT ILLNESS   (Location/Symptom, Timing/Onset,Context/Setting, Quality, Duration, Modifying Factors, Severity)  Note limiting factors. Joss Munoz is a 80 y.o. female who presents to the emergency department from dermatology where she was being evaluated for a lesion on her skin. She was found to have high blood pressure there they sent her over because of her numbers. She denies any symptoms and in fact is rolling her eyes at being here. She denies chest pain chest pressure headache nausea vomiting vision changes abdominal pain anuria or other. She was just in her doctor's office last week and her blood pressure was normal there she also had Mohs procedure recently during which her blood pressure was normal.  Patient is pretty certain thinking about it that she likely forgot to take her meds this morning. 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 She is very functional and does not typically need help remembering to take her medicines however she had a lot going on this morning and recently and is fairly certain that it slipped her mind. HPI    NursingNotes were reviewed. REVIEW OF SYSTEMS    (2-9 systems for level 4, 10 or more for level 5)     Review of Systems   Constitutional: Negative for activity change and appetite change. HENT: Negative for congestion, facial swelling and rhinorrhea. Eyes: Negative for photophobia and discharge. Respiratory: Negative for shortness of breath and wheezing.     Cardiovascular: Negative for chest pain. Gastrointestinal: Negative for abdominal distention, abdominal pain and vomiting. Endocrine: Negative for polydipsia and polyphagia. Genitourinary: Negative for difficulty urinating, frequency, vaginal bleeding and vaginal discharge. Musculoskeletal: Negative for gait problem. Skin: Negative for color change. Allergic/Immunologic: Negative for immunocompromised state. Neurological: Negative for dizziness, weakness and light-headedness. Hematological: Negative for adenopathy. Psychiatric/Behavioral: Negative for behavioral problems. Except as noted above the remainder of the review of systems was reviewed and negative. PAST MEDICAL HISTORY     Past Medical History:   Diagnosis Date    Cataract     1/2/2007     Diabetes mellitus ([facility] [address]     Esophageal stricture     6/29/2004  : SCHATZKI'S RING/STRICTURE    Hx of intestinal bypass     8/29/2000  : S/P RT HEMICOLECTOMY    Hyperlipidemia     Hypertension     Incarcerated inguinal hernia     5/7/2008  : Repair 2/29/08 [facility].     Schatzki's ring      : W/ ESOPHAGEAL STRICTURE         SURGICALHISTORY       Past Surgical History:   Procedure Laterality Date    CHOLECYSTECTOMY      COLONOSCOPY      ENDOSCOPY, COLON, DIAGNOSTIC      EYE SURGERY Right     cataract    HERNIA REPAIR Right     HYSTERECTOMY      JOINT REPLACEMENT Right     knee replacement    LAPAROSCOPY SURGICAL W/ VAGINAL HYSTERECTOMY      RIGHT COLECTOMY      2003     UPPER GASTROINTESTINAL ENDOSCOPY N/A 12/20/2016    EGD ESOPHAGOGASTRODUODENOSCOPY WITH DILATION performed by Cassie Kelly MD at [place] [address] 6/4/2020    EGD DIAGNOSTIC ONLY performed by Cassie Kelly MD at [address] [place] [address]       Previous Medications    ALLOPURINOL (ZYLOPRIM) 100 MG TABLET    Take 100 mg by mouth daily    ASPIRIN EC 81 MG EC TABLET    take 1 tablet by mouth daily    BLOOD GLUCOSE MONITORING SUPPL MISC    USE AS DIRECTED BY PHYSICIAN    CARVEDILOL (COREG) 12.5 MG TABLET    TAKE 1 TABLET BY MOUTH TWICE DAILY    CYANOCOBALAMIN 1000 MCG TABLET    TAKE 1 TABLET BY MOUTH DAILY FOR B-12 DEFICIENCY    EZETIMIBE (ZETIA) 10 MG TABLET    Take 10 mg by mouth daily    FAMOTIDINE (PEPCID) 20 MG TABLET    Take 1 tablet by mouth once for 1 dose    GLIPIZIDE (GLUCOTROL XL) 2.5 MG EXTENDED RELEASE TABLET    Take 2.5 mg by mouth daily    GLUCOSE BLOOD VI TEST STRIPS (ONE TOUCH ULTRA TEST) STRIP    Use to test blood sugar 1 time daily    LACTOBACILLUS (ACIDOPHILUS PO)    Take by mouth    LOSARTAN (COZAAR) 50 MG TABLET    Take 1 tablet by mouth daily    METFORMIN (GLUCOPHAGE) 500 MG TABLET    TAKE 1 TABLET BY MOUTH TWICE DAILY FOR DIABETES    OMEPRAZOLE (PRILOSEC) 20 MG DELAYED RELEASE CAPSULE    Take 20 mg by mouth daily    ONE TOUCH ULTRASOFT LANCETS MISC    USE TO TEST BLOOD SUGAR 1 TIME DAILY AS NEEDED FOR DIABETES    POTASSIUM CHLORIDE (KLOR-CON M) 20 MEQ EXTENDED RELEASE TABLET    Take 20 mEq by mouth 2 times daily    PRAVASTATIN (PRAVACHOL) 10 MG TABLET    Take 1 tablet by mouth daily at bedtime for cholesterol    TORSEMIDE (DEMADEX) 20 MG TABLET    Take 20 mg by mouth daily       ALLERGIES     Atenolol, Lisinopril, Lovastatin, Naproxen, and Atorvastatin    FAMILY HISTORY       Family History   Problem Relation Age of Onset    Pancreatic Cancer Mother           SOCIAL HISTORY       Social History     Socioeconomic History    Marital status:       Spouse name: None    Number of children: None    Years of education: None    Highest education level: None   Occupational History    None   Tobacco Use    Smoking status: Never Smoker    Smokeless tobacco: Never Used   Substance and Sexual Activity    Alcohol use: No     Alcohol/week: 0.0 standard drinks    Drug use: No    Sexual activity: None   Other Topics Concern    None   Social History Narrative    None     Social Determinants of Health Financial Resource Strain:     Difficulty of Paying Living Expenses:    Food Insecurity:     Worried About Running Out of Food in the Last Year:     [address] in the Last Year:    Transportation Needs:     Lack of Transportation (Medical):  Lack of Transportation (Non-Medical):    Physical Activity:     Days of Exercise per Week:     Minutes of Exercise per Session:    Stress:     Feeling of Stress :    Social Connections:     Frequency of Communication with Friends and Family:     Frequency of Social Gatherings with Friends and Family:     Attends Buddhism Services:     Active Member of Clubs or Organizations:     Attends Club or Organization Meetings:     Marital Status:    Intimate Partner Violence:     Fear of Current or Ex-Partner:     Emotionally Abused:     Physically Abused:     Sexually Abused:        SCREENINGS      @FLOW([id number])@      PHYSICAL EXAM    (up to 7 for level 4, 8 or more for level 5)     ED Triage Vitals [07/26/21 1620]   BP Temp Temp Source Pulse Resp SpO2 Height Weight   (!) 228/98 97.7 °F (36.5 °C) Oral 95 20 100 % 5' 2\" (1.575 m) 130 lb (59 kg)       Physical Exam  Constitutional:       General: She is not in acute distress. Appearance: She is well-developed. She is not ill-appearing or toxic-appearing. Comments: Patient looks amazing for her stated age. HENT:      Head: Normocephalic and atraumatic. Eyes:      Conjunctiva/sclera: Conjunctivae normal.      Pupils: Pupils are equal, round, and reactive to light. Cardiovascular:      Rate and Rhythm: Normal rate. Pulmonary:      Effort: Pulmonary effort is normal.   Abdominal:      General: Bowel sounds are normal.      Palpations: Abdomen is soft. Musculoskeletal:         General: Normal range of motion. Cervical back: Normal range of motion and neck supple. Skin:     General: Skin is warm and dry.    Neurological:      Mental Status: She is alert and oriented to person, place, and time.      Deep Tendon Reflexes: Reflexes are normal and symmetric. DIAGNOSTIC RESULTS     EKG: All EKG's are interpreted by the Emergency Department Physician who either signs or Co-signsthis chart in the absence of a cardiologist.        RADIOLOGY:   [facility] such as CT, Ultrasound and MRI are read by the radiologist. Plain radiographic images are visualized and preliminarily interpreted by the emergency physician with the below findings:        Interpretation per the Radiologist below, if available at the time ofthis note:    No orders to display         ED BEDSIDE ULTRASOUND:   Performed by ED Physician - none    LABS:  Labs Reviewed - No data to display    All other labs were within normal range or not returned as of this dictation. EMERGENCY DEPARTMENT COURSE and DIFFERENTIAL DIAGNOSIS/MDM:   Vitals:    Vitals:    07/26/21 1620 07/26/21 1714   BP: (!) 228/98 (!) 184/83   Pulse: 95    Resp: 20    Temp: 97.7 °F (36.5 °C)    TempSrc: Oral    SpO2: 100%    Weight: 130 lb (59 kg)    Height: 5' 2\" (1.575 m)        Patient is given 2 blood pressure medicines that she typically would take at home and we watched her here in the emergency department. Patient is very motivated to be discharged, she is given her home meds her pressure is already starting to resolve and she is conversing with us easily. Clinically she is medically stable. She is discharged back to her home via [facility]. MDM    CRITICAL CARE TIME   Total Critical Care time was 0 minutes, excluding separately reportableprocedures. There was a high probability of clinicallysignificant/life threatening deterioration in the patient's condition which required my urgent intervention. CONSULTS:  None    PROCEDURES:  Unless otherwise noted below, none     Procedures    FINAL IMPRESSION      1.  Essential hypertension          DISPOSITION/PLAN   DISPOSITION Decision To Discharge 07/26/2021 05:26:17 PM      PATIENT REFERRED TO:  Eleno Phillip

## 2021-07-26 NOTE — ED TRIAGE NOTES
Patient arrived from [facility] urgent care for skin tag on left elbow. Urgent care sent patient here due to htn. Patient stated she took her home medications, but might have forgotten and has been under stress lately. Patient A&OX4. Patient has no complaints of headache, sob, chest pain, nausea, vomiting, diarrhea, and blurred vision.

## 2021-07-26 NOTE — ED NOTES
Patient upset and requesting to go home. Patient stated she didn't even want to be here. Dr. Marek Kennedy made aware.       Lucita Walls RN  07/26/21 5706

## 2021-07-26 NOTE — ED NOTES
Call placed for Lyft ride for patient to go [facility] to get her vehicle.   Lyft info;  is [employer] plate [id number]   [phone] Aslanidis  07/26/21 5234

## 2021-11-09 ENCOUNTER — TELEPHONE (OUTPATIENT)
Dept: GASTROENTEROLOGY | Age: 86
End: 2021-11-09

## 2021-11-09 NOTE — TELEPHONE ENCOUNTER
Patient is calling to see if you can prescribe something for her acid reflux? She wants something else besides what you prescribed last time.

## 2023-10-27 ENCOUNTER — OFFICE VISIT (OUTPATIENT)
Dept: FAMILY MEDICINE CLINIC | Age: 88
End: 2023-10-27
Payer: MEDICARE

## 2023-10-27 VITALS
WEIGHT: 156.4 LBS | OXYGEN SATURATION: 98 % | SYSTOLIC BLOOD PRESSURE: 110 MMHG | DIASTOLIC BLOOD PRESSURE: 60 MMHG | TEMPERATURE: 98.2 F | BODY MASS INDEX: 30.7 KG/M2 | HEART RATE: 106 BPM | HEIGHT: 60 IN

## 2023-10-27 DIAGNOSIS — R35.0 FREQUENT URINATION: ICD-10-CM

## 2023-10-27 DIAGNOSIS — J40 BRONCHITIS: Primary | ICD-10-CM

## 2023-10-27 LAB
BILIRUBIN, POC: NORMAL
BLOOD URINE, POC: NORMAL
CLARITY, POC: NORMAL
COLOR, POC: YELLOW
GLUCOSE URINE, POC: NORMAL
KETONES, POC: NORMAL
LEUKOCYTE EST, POC: NORMAL
NITRITE, POC: NORMAL
PH, POC: 5.5
PROTEIN, POC: NORMAL
SPECIFIC GRAVITY, POC: 1.02
UROBILINOGEN, POC: NORMAL

## 2023-10-27 PROCEDURE — 99204 OFFICE O/P NEW MOD 45 MIN: CPT | Performed by: NURSE PRACTITIONER

## 2023-10-27 PROCEDURE — 1123F ACP DISCUSS/DSCN MKR DOCD: CPT | Performed by: NURSE PRACTITIONER

## 2023-10-27 PROCEDURE — 81003 URINALYSIS AUTO W/O SCOPE: CPT | Performed by: NURSE PRACTITIONER

## 2023-10-27 RX ORDER — DOXYCYCLINE HYCLATE 100 MG
100 TABLET ORAL 2 TIMES DAILY
Qty: 20 TABLET | Refills: 0 | Status: SHIPPED | OUTPATIENT
Start: 2023-10-27 | End: 2023-11-06

## 2023-10-27 RX ORDER — PREDNISONE 20 MG/1
TABLET ORAL
Qty: 11 TABLET | Refills: 0 | Status: SHIPPED | OUTPATIENT
Start: 2023-10-27

## 2023-10-27 RX ORDER — PROMETHAZINE HYDROCHLORIDE AND CODEINE PHOSPHATE 6.25; 1 MG/5ML; MG/5ML
5 SYRUP ORAL 2 TIMES DAILY PRN
Qty: 80 ML | Refills: 0 | Status: SHIPPED | OUTPATIENT
Start: 2023-10-27 | End: 2023-11-04

## 2023-10-27 SDOH — ECONOMIC STABILITY: HOUSING INSECURITY
IN THE LAST 12 MONTHS, WAS THERE A TIME WHEN YOU DID NOT HAVE A STEADY PLACE TO SLEEP OR SLEPT IN A SHELTER (INCLUDING NOW)?: NO

## 2023-10-27 SDOH — ECONOMIC STABILITY: INCOME INSECURITY: HOW HARD IS IT FOR YOU TO PAY FOR THE VERY BASICS LIKE FOOD, HOUSING, MEDICAL CARE, AND HEATING?: NOT HARD AT ALL

## 2023-10-27 SDOH — ECONOMIC STABILITY: FOOD INSECURITY: WITHIN THE PAST 12 MONTHS, YOU WORRIED THAT YOUR FOOD WOULD RUN OUT BEFORE YOU GOT MONEY TO BUY MORE.: NEVER TRUE

## 2023-10-27 SDOH — ECONOMIC STABILITY: FOOD INSECURITY: WITHIN THE PAST 12 MONTHS, THE FOOD YOU BOUGHT JUST DIDN'T LAST AND YOU DIDN'T HAVE MONEY TO GET MORE.: NEVER TRUE

## 2023-10-27 ASSESSMENT — ENCOUNTER SYMPTOMS
RHINORRHEA: 0
SHORTNESS OF BREATH: 0
NAUSEA: 0
VOMITING: 0
VOICE CHANGE: 1
WHEEZING: 1
DIARRHEA: 0
COUGH: 1
SORE THROAT: 1

## 2023-10-27 ASSESSMENT — PATIENT HEALTH QUESTIONNAIRE - PHQ9
SUM OF ALL RESPONSES TO PHQ QUESTIONS 1-9: 0
1. LITTLE INTEREST OR PLEASURE IN DOING THINGS: 0
2. FEELING DOWN, DEPRESSED OR HOPELESS: 0
SUM OF ALL RESPONSES TO PHQ QUESTIONS 1-9: 0
SUM OF ALL RESPONSES TO PHQ9 QUESTIONS 1 & 2: 0

## 2023-10-27 NOTE — PROGRESS NOTES
Lisinopril Other (See Comments)     Cough    Lovastatin Other (See Comments)     Myalgias    Naproxen Other (See Comments)     Dyspepsia    Atorvastatin      Leg cramps     Current Outpatient Medications   Medication Sig Dispense Refill    promethazine-codeine (PHENERGAN WITH CODEINE) 6.25-10 MG/5ML syrup Take 5 mLs by mouth 2 times daily as needed for Cough for up to 8 days.  Max Daily Amount: 10 mLs 80 mL 0    predniSONE (DELTASONE) 20 MG tablet Take 2 tabs daily for 4 days then 1 tab daily for 3 days 11 tablet 0    doxycycline hyclate (VIBRA-TABS) 100 MG tablet Take 1 tablet by mouth 2 times daily for 10 days 20 tablet 0    torsemide (DEMADEX) 20 MG tablet Take 1 tablet by mouth daily      potassium chloride (KLOR-CON M) 20 MEQ extended release tablet Take 1 tablet by mouth 2 times daily      allopurinol (ZYLOPRIM) 100 MG tablet Take 1 tablet by mouth daily      ezetimibe (ZETIA) 10 MG tablet Take 1 tablet by mouth daily      glipiZIDE (GLUCOTROL XL) 2.5 MG extended release tablet Take 1 tablet by mouth daily      omeprazole (PRILOSEC) 20 MG delayed release capsule Take 1 capsule by mouth daily      Lactobacillus (ACIDOPHILUS PO) Take by mouth as needed      losartan (COZAAR) 50 MG tablet Take 1 tablet by mouth daily 90 tablet 2    metFORMIN (GLUCOPHAGE) 500 MG tablet TAKE 1 TABLET BY MOUTH TWICE DAILY FOR DIABETES 180 tablet 3    ONE TOUCH ULTRASOFT LANCETS MISC USE TO TEST BLOOD SUGAR 1 TIME DAILY AS NEEDED FOR DIABETES 100 each 5    glucose blood VI test strips (ONE TOUCH ULTRA TEST) strip Use to test blood sugar 1 time daily 100 each 6    cyanocobalamin 1000 MCG tablet TAKE 1 TABLET BY MOUTH DAILY FOR B-12 DEFICIENCY 100 3    Blood Glucose Monitoring Suppl MISC USE AS DIRECTED BY PHYSICIAN 100 3    famotidine (PEPCID) 20 MG tablet Take 1 tablet by mouth once for 1 dose 60 tablet 2    pravastatin (PRAVACHOL) 10 MG tablet Take 1 tablet by mouth daily at bedtime for cholesterol 90 tablet 3    carvedilol (COREG)

## 2023-10-28 LAB — BACTERIA UR CULT: NORMAL

## 2023-10-30 DIAGNOSIS — R05.1 ACUTE COUGH: Primary | ICD-10-CM

## 2023-10-30 DIAGNOSIS — J40 BRONCHITIS: ICD-10-CM

## 2023-10-30 RX ORDER — DEXTROMETHORPHAN HYDROBROMIDE AND PROMETHAZINE HYDROCHLORIDE 15; 6.25 MG/5ML; MG/5ML
5 SYRUP ORAL 2 TIMES DAILY PRN
Qty: 60 ML | Refills: 0 | Status: SHIPPED | OUTPATIENT
Start: 2023-10-30

## 2023-10-30 RX ORDER — PROMETHAZINE HYDROCHLORIDE AND CODEINE PHOSPHATE 6.25; 1 MG/5ML; MG/5ML
5 SYRUP ORAL 2 TIMES DAILY PRN
Qty: 80 ML | Refills: 0 | Status: SHIPPED | OUTPATIENT
Start: 2023-10-30 | End: 2023-11-07

## 2023-12-06 ENCOUNTER — TRANSCRIBE ORDERS (OUTPATIENT)
Dept: ADMINISTRATIVE | Age: 88
End: 2023-12-06

## 2023-12-06 DIAGNOSIS — Z12.31 ENCOUNTER FOR SCREENING MAMMOGRAM FOR MALIGNANT NEOPLASM OF BREAST: Primary | ICD-10-CM

## 2023-12-13 ENCOUNTER — HOSPITAL ENCOUNTER (OUTPATIENT)
Dept: WOMENS IMAGING | Age: 88
Discharge: HOME | End: 2023-12-15
Payer: MEDICARE

## 2023-12-13 DIAGNOSIS — Z12.31 SCREENING MAMMOGRAM FOR BREAST CANCER: ICD-10-CM

## 2023-12-13 PROCEDURE — 77063 BREAST TOMOSYNTHESIS BI: CPT

## 2024-01-18 ENCOUNTER — OFFICE VISIT (OUTPATIENT)
Dept: FAMILY MEDICINE CLINIC | Age: 89
End: 2024-01-18
Payer: MEDICARE

## 2024-01-18 VITALS
BODY MASS INDEX: 23.16 KG/M2 | HEIGHT: 60 IN | OXYGEN SATURATION: 95 % | HEART RATE: 82 BPM | HEART RATE: 82 BPM | DIASTOLIC BLOOD PRESSURE: 80 MMHG | SYSTOLIC BLOOD PRESSURE: 138 MMHG | RESPIRATION RATE: 16 BRPM | OXYGEN SATURATION: 95 % | WEIGHT: 118 LBS | RESPIRATION RATE: 16 BRPM | SYSTOLIC BLOOD PRESSURE: 138 MMHG | WEIGHT: 118 LBS | HEIGHT: 60 IN | DIASTOLIC BLOOD PRESSURE: 80 MMHG | BODY MASS INDEX: 23.16 KG/M2

## 2024-01-18 DIAGNOSIS — E53.8 VITAMIN B12 DEFICIENCY: ICD-10-CM

## 2024-01-18 DIAGNOSIS — I10 HTN (HYPERTENSION), BENIGN: ICD-10-CM

## 2024-01-18 DIAGNOSIS — R25.2 LEG CRAMPS: ICD-10-CM

## 2024-01-18 DIAGNOSIS — N18.9 CHRONIC KIDNEY DISEASE, UNSPECIFIED CKD STAGE: ICD-10-CM

## 2024-01-18 DIAGNOSIS — E11.9 TYPE 2 DIABETES MELLITUS WITHOUT COMPLICATION, WITHOUT LONG-TERM CURRENT USE OF INSULIN (HCC): ICD-10-CM

## 2024-01-18 DIAGNOSIS — E11.9 TYPE 2 DIABETES MELLITUS WITHOUT COMPLICATION, WITHOUT LONG-TERM CURRENT USE OF INSULIN (HCC): Primary | ICD-10-CM

## 2024-01-18 DIAGNOSIS — Z00.00 INITIAL MEDICARE ANNUAL WELLNESS VISIT: Primary | ICD-10-CM

## 2024-01-18 LAB
ALBUMIN SERPL-MCNC: 4.3 G/DL (ref 3.5–4.6)
ALP SERPL-CCNC: 147 U/L (ref 40–130)
ALT SERPL-CCNC: 21 U/L (ref 0–33)
ANION GAP SERPL CALCULATED.3IONS-SCNC: 9 MEQ/L (ref 9–15)
AST SERPL-CCNC: 19 U/L (ref 0–35)
BILIRUB SERPL-MCNC: <0.2 MG/DL (ref 0.2–0.7)
BUN SERPL-MCNC: 27 MG/DL (ref 8–23)
CALCIUM SERPL-MCNC: 9.8 MG/DL (ref 8.5–9.9)
CHLORIDE SERPL-SCNC: 106 MEQ/L (ref 95–107)
CHOLEST SERPL-MCNC: 189 MG/DL (ref 0–199)
CO2 SERPL-SCNC: 26 MEQ/L (ref 20–31)
CREAT SERPL-MCNC: 0.99 MG/DL (ref 0.5–0.9)
FOLATE: 6.2 NG/ML
GLOBULIN SER CALC-MCNC: 3.2 G/DL (ref 2.3–3.5)
GLUCOSE SERPL-MCNC: 122 MG/DL (ref 70–99)
HBA1C MFR BLD: 7.1 % (ref 4.8–5.9)
HDLC SERPL-MCNC: 54 MG/DL (ref 40–59)
LDL CHOLESTEROL CALCULATED: 100 MG/DL (ref 0–129)
MAGNESIUM SERPL-MCNC: 1.7 MG/DL (ref 1.7–2.4)
POTASSIUM SERPL-SCNC: 4.1 MEQ/L (ref 3.4–4.9)
PROT SERPL-MCNC: 7.5 G/DL (ref 6.3–8)
SODIUM SERPL-SCNC: 141 MEQ/L (ref 135–144)
TRIGLYCERIDE, FASTING: 174 MG/DL (ref 0–150)
VITAMIN B-12: >2000 PG/ML (ref 232–1245)

## 2024-01-18 PROCEDURE — 1123F ACP DISCUSS/DSCN MKR DOCD: CPT | Performed by: PHYSICIAN ASSISTANT

## 2024-01-18 PROCEDURE — G0438 PPPS, INITIAL VISIT: HCPCS | Performed by: PHYSICIAN ASSISTANT

## 2024-01-18 PROCEDURE — 99214 OFFICE O/P EST MOD 30 MIN: CPT | Performed by: PHYSICIAN ASSISTANT

## 2024-01-18 RX ORDER — AMLODIPINE BESYLATE 10 MG/1
TABLET ORAL
COMMUNITY
Start: 2023-09-20

## 2024-01-18 ASSESSMENT — ENCOUNTER SYMPTOMS
CHEST TIGHTNESS: 0
ABDOMINAL PAIN: 0
PHOTOPHOBIA: 0
BLOOD IN STOOL: 0
NAUSEA: 0
DIARRHEA: 0
VOMITING: 0
SHORTNESS OF BREATH: 0

## 2024-01-18 ASSESSMENT — LIFESTYLE VARIABLES
HOW OFTEN DO YOU HAVE A DRINK CONTAINING ALCOHOL: NEVER
HOW MANY STANDARD DRINKS CONTAINING ALCOHOL DO YOU HAVE ON A TYPICAL DAY: PATIENT DOES NOT DRINK

## 2024-01-18 ASSESSMENT — PATIENT HEALTH QUESTIONNAIRE - PHQ9
SUM OF ALL RESPONSES TO PHQ9 QUESTIONS 1 & 2: 0
SUM OF ALL RESPONSES TO PHQ QUESTIONS 1-9: 0
1. LITTLE INTEREST OR PLEASURE IN DOING THINGS: 0
SUM OF ALL RESPONSES TO PHQ QUESTIONS 1-9: 0
SUM OF ALL RESPONSES TO PHQ QUESTIONS 1-9: 0
2. FEELING DOWN, DEPRESSED OR HOPELESS: 0
SUM OF ALL RESPONSES TO PHQ QUESTIONS 1-9: 0

## 2024-01-18 NOTE — PROGRESS NOTES
Subjective  Rachelmarco Sarmiento, 89 y.o. female presents today with:  Chief Complaint   Patient presents with    New Patient     Possible change of care states she currently sees Dr. Gregory but this is closer to home for her        HPI    Dr. Gregory--for nephrology/primary care.   She is seen every 3-4 months.     Dr. Raygoza former cardiologist. Had cardiac stress test.  NORMAL testing.  She does have a systolic murmur.  She is followed yearly.     Dr. Gaines for skin.  Has some dry skin of her face.  Does not apply any type of face cream/lotion.  History or skin ca.     Has been experiencing leg cramping/pain.  This was infrequent but now happening 3-4 times/week.  No known PAD.     Diabetic.  Takes glipizide twice daily.  She does check her blood sugars.      No tobacco/nicotine.  No alcohol.  Likes to walk/golf for exercise.   .  One living son (one ).       Review of Systems   Constitutional:  Negative for appetite change, chills, fatigue, fever and unexpected weight change.   HENT:  Negative for hearing loss and nosebleeds.    Eyes:  Negative for photophobia and visual disturbance.   Respiratory:  Negative for chest tightness and shortness of breath.    Cardiovascular:  Negative for chest pain and leg swelling.   Gastrointestinal:  Negative for abdominal pain, blood in stool, diarrhea, nausea and vomiting.   Endocrine: Negative for cold intolerance and heat intolerance.   Genitourinary:  Negative for dysuria, hematuria, menstrual problem and urgency.   Musculoskeletal:  Positive for myalgias (legs). Negative for arthralgias and joint swelling.   Skin:  Negative for rash.   Neurological:  Negative for dizziness, weakness, numbness and headaches.   Psychiatric/Behavioral:  Negative for dysphoric mood and sleep disturbance. The patient is not nervous/anxious.        Past Medical History:   Diagnosis Date    Cataract     2007     Diabetes mellitus (HCC)     Esophageal stricture     2004

## 2024-01-18 NOTE — PROGRESS NOTES
Medicare Annual Wellness Visit    Rachel Sarmiento is here for Medicare AWV    Assessment & Plan   Initial Medicare annual wellness visit  Recommendations for Preventive Services Due: see orders and patient instructions/AVS.  Recommended screening schedule for the next 5-10 years is provided to the patient in written form: see Patient Instructions/AVS.     No follow-ups on file.     Subjective       Patient's complete Health Risk Assessment and screening values have been reviewed and are found in Flowsheets. The following problems were reviewed today and where indicated follow up appointments were made and/or referrals ordered.    Positive Risk Factor Screenings with Interventions:    Fall Risk:  Do you feel unsteady or are you worried about falling? : no  2 or more falls in past year?: (!) yes  Fall with injury in past year?: no     Interventions:    Reviewed medications, home hazards, visual acuity, and co-morbidities that can increase risk for falls  Patient declines any further evaluation or treatment                                 Objective   Vitals:    01/18/24 1015   BP: 138/80   Site: Left Upper Arm   Position: Sitting   Cuff Size: Medium Adult   Pulse: 82   Resp: 16   SpO2: 95%   Weight: 53.5 kg (118 lb)   Height: 1.524 m (5')      Body mass index is 23.05 kg/m².               Allergies   Allergen Reactions    Atenolol Other (See Comments)     Fatigue, bradycardia    Lisinopril Other (See Comments)     Cough    Lovastatin Other (See Comments)     Myalgias    Naproxen Other (See Comments)     Dyspepsia    Atorvastatin      Leg cramps     Prior to Visit Medications    Medication Sig Taking? Authorizing Provider   amLODIPine (NORVASC) 10 MG tablet  Yes Matias Ulrich MD   torsemide (DEMADEX) 20 MG tablet Take 1 tablet by mouth daily Yes Matias Ulrich MD   potassium chloride (KLOR-CON M) 20 MEQ extended release tablet Take 1 tablet by mouth 2 times daily Yes Matias Ulrich MD 
83 y old male with a history of mild dementia on Aricept ,hx of hyperlipidemia and kidney stones  send from PMD dr Tafoya for bradycardia ,Pt Stays what couple of days before he had some chest pressure and l arm pain ,He denies any symptoms at present
(1) body pink, extremities blue

## 2024-01-22 LAB — VIT B1 SERPL-MCNC: 11 NMOL/L (ref 4–15)

## 2024-03-12 ENCOUNTER — TELEPHONE (OUTPATIENT)
Dept: GASTROENTEROLOGY | Age: 89
End: 2024-03-12

## 2024-03-12 ENCOUNTER — OFFICE VISIT (OUTPATIENT)
Dept: GASTROENTEROLOGY | Age: 89
End: 2024-03-12
Payer: MEDICARE

## 2024-03-12 VITALS
HEART RATE: 90 BPM | HEIGHT: 60 IN | WEIGHT: 119 LBS | DIASTOLIC BLOOD PRESSURE: 87 MMHG | SYSTOLIC BLOOD PRESSURE: 164 MMHG | BODY MASS INDEX: 23.36 KG/M2 | OXYGEN SATURATION: 95 %

## 2024-03-12 DIAGNOSIS — K21.9 GASTROESOPHAGEAL REFLUX DISEASE, UNSPECIFIED WHETHER ESOPHAGITIS PRESENT: Primary | ICD-10-CM

## 2024-03-12 PROCEDURE — 99203 OFFICE O/P NEW LOW 30 MIN: CPT | Performed by: SPECIALIST

## 2024-03-12 PROCEDURE — 1123F ACP DISCUSS/DSCN MKR DOCD: CPT | Performed by: SPECIALIST

## 2024-03-12 RX ORDER — FUROSEMIDE 20 MG/1
TABLET ORAL
COMMUNITY
Start: 2024-02-12

## 2024-03-12 RX ORDER — FAMOTIDINE 20 MG/1
20 TABLET, FILM COATED ORAL DAILY
Qty: 30 TABLET | Refills: 3 | Status: SHIPPED | OUTPATIENT
Start: 2024-03-12

## 2024-03-12 RX ORDER — RABEPRAZOLE SODIUM 20 MG/1
20 TABLET, DELAYED RELEASE ORAL DAILY
Qty: 30 TABLET | Refills: 3 | Status: SHIPPED | OUTPATIENT
Start: 2024-03-12

## 2024-03-12 ASSESSMENT — ENCOUNTER SYMPTOMS
NAUSEA: 0
ABDOMINAL PAIN: 0
BLOOD IN STOOL: 0
VOMITING: 0
RECTAL PAIN: 0
ABDOMINAL DISTENTION: 0
DIARRHEA: 0
EYES NEGATIVE: 1
GASTROINTESTINAL NEGATIVE: 1
RESPIRATORY NEGATIVE: 1
ANAL BLEEDING: 0
CONSTIPATION: 0

## 2024-03-12 NOTE — TELEPHONE ENCOUNTER
The patient forgot to ask you what foods should she eliminate with her diet, she would like the Pepcid medication to go to [facility].

## 2024-03-12 NOTE — PROGRESS NOTES
Gastroenterology Clinic Visit    Rachel Sarmiento  [id number]  Chief Complaint   Patient presents with    Follow-up     gerd       HPI: 89 y.o. female presents to the clinic with history of chronic GERD and has been on omeprazole 20 g once a day.  Patient had EGD in 2016 which showed a Schatzki's ring and a hiatal hernia and had dilation with an 18 mm savory dilator.  Patient reports having nocturnal symptoms predominantly regurgitation and occasional heartburn, no emesis.  Reports occasional solid food dysphagia.  Reports having burning sensation in the throat and upper chest area.  No weight loss no GI bleeding.  Patient is on oral potassium supplements.  Social history does not smoke does not drink alcohol    Review of Systems   Constitutional: Negative.    HENT: Negative.     Eyes: Negative.    Respiratory: Negative.     Cardiovascular: Negative.    Gastrointestinal: Negative.  Negative for abdominal distention, abdominal pain, anal bleeding, blood in stool, constipation, diarrhea, nausea, rectal pain and vomiting.        GERD symptoms and occasional dysphagia   Endocrine: Negative.         Diabetes mellitus   Genitourinary: Negative.    Musculoskeletal: Negative.    Skin: Negative.    Allergic/Immunologic: Negative for food allergies.   Neurological: Negative.    Hematological: Negative.    Psychiatric/Behavioral: Negative.          Past Medical History:   Diagnosis Date    Cataract     1/2/2007     Diabetes mellitus (HCC)     Esophageal stricture     6/29/2004  : SCHATZKI'S RING/STRICTURE    Hx of intestinal bypass     8/29/2000  : S/P RT HEMICOLECTOMY    Hyperlipidemia     Hypertension     Incarcerated inguinal hernia     5/7/2008  : Repair 2/29/08 in [facility].    Schatzki's ring      : W/ ESOPHAGEAL STRICTURE    Squamous cell carcinoma of scalp 09/01/2015      Past Surgical History:   Procedure Laterality Date    CHOLECYSTECTOMY      COLONOSCOPY      ENDOSCOPY, COLON, DIAGNOSTIC      EYE SURGERY Right     cataract

## 2024-05-16 ENCOUNTER — HOSPITAL ENCOUNTER (OUTPATIENT)
Dept: GENERAL RADIOLOGY | Age: 89
Discharge: HOME | End: 2024-05-18
Payer: MEDICARE

## 2024-05-16 ENCOUNTER — OFFICE VISIT (OUTPATIENT)
Dept: FAMILY MEDICINE CLINIC | Age: 89
End: 2024-05-16
Payer: MEDICARE

## 2024-05-16 VITALS
SYSTOLIC BLOOD PRESSURE: 140 MMHG | HEART RATE: 72 BPM | WEIGHT: 119 LBS | DIASTOLIC BLOOD PRESSURE: 74 MMHG | HEIGHT: 60 IN | BODY MASS INDEX: 23.36 KG/M2 | OXYGEN SATURATION: 98 % | TEMPERATURE: 97.5 F | RESPIRATION RATE: 14 BRPM

## 2024-05-16 DIAGNOSIS — M25.512 ACUTE PAIN OF LEFT SHOULDER: Primary | ICD-10-CM

## 2024-05-16 DIAGNOSIS — M25.512 ACUTE PAIN OF LEFT SHOULDER: ICD-10-CM

## 2024-05-16 PROCEDURE — 99213 OFFICE O/P EST LOW 20 MIN: CPT | Performed by: NURSE PRACTITIONER

## 2024-05-16 PROCEDURE — 1123F ACP DISCUSS/DSCN MKR DOCD: CPT | Performed by: NURSE PRACTITIONER

## 2024-05-16 PROCEDURE — 73030 X-RAY EXAM OF SHOULDER: CPT

## 2024-05-16 NOTE — PROGRESS NOTES
Subjective  Rachel RUSSO Torsten, 89 y.o. female presents today with:  Chief Complaint   Patient presents with    Arm Pain     Left arm/ shoulder no chest pain x2weeks        HPI  Presents to  for shoulder pain   Affected site: left   Unknown if trauma or injury occurred. States she doesn't always recall injuries & moves quickly.   Denies bruising or erythema   Denies warmth or swelling of shoulder   No intervention thus far   Bothers her worse at night when not active   Full ROM. Still doing normal daily activities.   Denies fever or chills                 Past Medical History:   Diagnosis Date    Cataract     1/2/2007     Diabetes mellitus (HCC)     Esophageal stricture     6/29/2004  : SCHATZKI'S RING/STRICTURE    Hx of intestinal bypass     8/29/2000  : S/P RT HEMICOLECTOMY    Hyperlipidemia     Hypertension     Incarcerated inguinal hernia     5/7/2008  : Repair 2/29/08 in [facility].    Schatzki's ring      : W/ ESOPHAGEAL STRICTURE    Squamous cell carcinoma of scalp 09/01/2015      Past Surgical History:   Procedure Laterality Date    CHOLECYSTECTOMY      COLONOSCOPY      ENDOSCOPY, COLON, DIAGNOSTIC      EYE SURGERY Right     cataract    HERNIA REPAIR Right     HYSTERECTOMY (CERVIX STATUS UNKNOWN)      JOINT REPLACEMENT Right     knee replacement    LAPAROSCOPY SURGICAL W/ VAGINAL HYSTERECTOMY      RIGHT COLECTOMY      2003     UPPER GASTROINTESTINAL ENDOSCOPY N/A 12/20/2016    EGD ESOPHAGOGASTRODUODENOSCOPY WITH DILATION performed by Sushil Sow MD at [facility]    UPPER GASTROINTESTINAL ENDOSCOPY N/A 6/4/2020    EGD DIAGNOSTIC ONLY performed by Sushil Sow MD at [facility]     Family History   Problem Relation Age of Onset    Pancreatic Cancer Mother            Review of Systems   Constitutional:  Negative for activity change, appetite change, chills and fever.   Musculoskeletal:  Positive for arthralgias. Negative for joint swelling and neck stiffness.   Psychiatric/Behavioral:

## 2024-05-23 ENCOUNTER — TELEPHONE (OUTPATIENT)
Dept: FAMILY MEDICINE CLINIC | Age: 89
End: 2024-05-23

## 2024-05-23 NOTE — TELEPHONE ENCOUNTER
Patient called in she is aware of the X-ray results. She would like to know what could she do to help the degenerative arthritis. Please advise

## 2024-05-25 NOTE — TELEPHONE ENCOUNTER
She can stay active as she is & when experiences discomfort lessen how much she is doing that day.   Ice her shoulder daily and after activity.   If the pain is persistent, can consider Ortho for evaluation/treatment.

## 2024-06-19 ENCOUNTER — OFFICE VISIT (OUTPATIENT)
Dept: GASTROENTEROLOGY | Age: 89
End: 2024-06-19
Payer: MEDICARE

## 2024-06-19 VITALS — OXYGEN SATURATION: 97 % | WEIGHT: 119 LBS | BODY MASS INDEX: 23.36 KG/M2 | HEART RATE: 74 BPM | HEIGHT: 60 IN

## 2024-06-19 DIAGNOSIS — K21.9 GASTROESOPHAGEAL REFLUX DISEASE WITHOUT ESOPHAGITIS: Primary | ICD-10-CM

## 2024-06-19 PROCEDURE — 1123F ACP DISCUSS/DSCN MKR DOCD: CPT | Performed by: SPECIALIST

## 2024-06-19 PROCEDURE — 99212 OFFICE O/P EST SF 10 MIN: CPT | Performed by: SPECIALIST

## 2024-06-19 RX ORDER — POTASSIUM CHLORIDE 1500 MG/1
20 TABLET, EXTENDED RELEASE ORAL DAILY
COMMUNITY
Start: 2024-06-05

## 2024-06-19 ASSESSMENT — ENCOUNTER SYMPTOMS
CONSTIPATION: 0
GASTROINTESTINAL NEGATIVE: 1
VOMITING: 0
DIARRHEA: 0
BLOOD IN STOOL: 0
EYES NEGATIVE: 1
ANAL BLEEDING: 0
ABDOMINAL DISTENTION: 0
NAUSEA: 0
RECTAL PAIN: 0
RESPIRATORY NEGATIVE: 1
ABDOMINAL PAIN: 0

## 2024-06-19 NOTE — PROGRESS NOTES
Gastroenterology Clinic Follow up Visit    Rachel Sarmiento  [id number]  Chief Complaint   Patient presents with    Follow-up     4 MONTH F/U       HPI and A/P at last visit summarized below:  Patient is here for follow-up.  He was not responding to omeprazole and it was changed to rabeprazole 20 mg once a day with control of symptoms.  Patient reports no significant dysphagia.  No dysphagia no GI bleeding                Review of Systems   Constitutional: Negative.    HENT: Negative.     Eyes: Negative.    Respiratory: Negative.     Cardiovascular: Negative.    Gastrointestinal: Negative.  Negative for abdominal distention, abdominal pain, anal bleeding, blood in stool, constipation, diarrhea, nausea, rectal pain and vomiting.        History of GERD controlled with rabeprazole and Pepcid   Endocrine: Negative.    Genitourinary: Negative.    Musculoskeletal: Negative.    Skin: Negative.    Allergic/Immunologic: Negative for food allergies.   Neurological: Negative.    Hematological: Negative.    Psychiatric/Behavioral: Negative.          Past medical history, past surgical history, medication list, social and familyhistory reviewed    Pulse 74, height 1.524 m (5'), weight 54 kg (119 lb), SpO2 97 %, not currently breastfeeding.    Physical Exam  Constitutional:       Appearance: She is well-developed.   HENT:      Head: Normocephalic and atraumatic.   Eyes:      Conjunctiva/sclera: Conjunctivae normal.      Pupils: Pupils are equal, round, and reactive to light.   Cardiovascular:      Rate and Rhythm: Normal rate.   Pulmonary:      Effort: Pulmonary effort is normal.   Abdominal:      General: Bowel sounds are normal.      Palpations: Abdomen is soft.      Comments: Soft nontender no palpable mass.   Musculoskeletal:         General: Normal range of motion.      Cervical back: Normal range of motion.   Skin:     General: Skin is warm.   Neurological:      Mental Status: She is alert.         Laboratory, Pathology,

## 2024-06-20 RX ORDER — RABEPRAZOLE SODIUM 20 MG/1
20 TABLET, DELAYED RELEASE ORAL DAILY
Qty: 90 TABLET | Refills: 0 | Status: SHIPPED | OUTPATIENT
Start: 2024-06-20

## 2024-06-20 RX ORDER — FAMOTIDINE 20 MG/1
20 TABLET, FILM COATED ORAL DAILY
Qty: 90 TABLET | Refills: 3 | Status: SHIPPED | OUTPATIENT
Start: 2024-06-20

## 2024-09-16 RX ORDER — RABEPRAZOLE SODIUM 20 MG/1
20 TABLET, DELAYED RELEASE ORAL DAILY
Qty: 90 TABLET | Refills: 0 | Status: SHIPPED | OUTPATIENT
Start: 2024-09-16

## 2024-11-08 ENCOUNTER — OFFICE VISIT (OUTPATIENT)
Dept: FAMILY MEDICINE CLINIC | Age: 89
End: 2024-11-08

## 2024-11-08 VITALS
SYSTOLIC BLOOD PRESSURE: 134 MMHG | WEIGHT: 118 LBS | HEART RATE: 108 BPM | BODY MASS INDEX: 23.16 KG/M2 | DIASTOLIC BLOOD PRESSURE: 74 MMHG | HEIGHT: 60 IN | TEMPERATURE: 97.1 F | OXYGEN SATURATION: 98 %

## 2024-11-08 DIAGNOSIS — H61.22 IMPACTED CERUMEN OF LEFT EAR: Primary | ICD-10-CM

## 2024-11-08 DIAGNOSIS — N39.0 FREQUENT UTI: ICD-10-CM

## 2024-11-08 LAB
BILIRUB UR QL STRIP: NEGATIVE
CLARITY UR: CLEAR
COLOR UR: YELLOW
GLUCOSE UR STRIP-MCNC: NEGATIVE MG/DL
HGB UR QL STRIP: NEGATIVE
KETONES UR STRIP-MCNC: NEGATIVE MG/DL
LEUKOCYTE ESTERASE UR QL STRIP: NEGATIVE
NITRITE UR QL STRIP: NEGATIVE
PH UR STRIP: 5 [PH] (ref 5–9)
PROT UR STRIP-MCNC: NEGATIVE MG/DL
SP GR UR STRIP: 1.01 (ref 1–1.03)
URINE REFLEX TO CULTURE: NORMAL
UROBILINOGEN UR STRIP-ACNC: 0.2 E.U./DL

## 2024-11-08 ASSESSMENT — ENCOUNTER SYMPTOMS
RESPIRATORY NEGATIVE: 1
GASTROINTESTINAL NEGATIVE: 1
EYES NEGATIVE: 1

## 2024-11-08 NOTE — PATIENT INSTRUCTIONS
Align over the counter probiotics to help reduce bladder infections  Cranberry and garlic for bladder health   We will be in touch with urine culture results

## 2024-11-08 NOTE — PROGRESS NOTES
Family History   Problem Relation Age of Onset    Pancreatic Cancer Mother           SOCIAL HISTORY       Social History     Socioeconomic History    Marital status:      Spouse name: None    Number of children: None    Years of education: None    Highest education level: None   Tobacco Use    Smoking status: Never     Passive exposure: Never    Smokeless tobacco: Never   Substance and Sexual Activity    Alcohol use: No     Alcohol/week: 0.0 standard drinks of alcohol    Drug use: No     Social Determinants of Health     Financial Resource Strain: Low Risk  (10/27/2023)    Overall Financial Resource Strain (CARDIA)     Difficulty of Paying Living Expenses: Not hard at all   Transportation Needs: Unknown (10/27/2023)    PRAPARE - Transportation     Lack of Transportation (Non-Medical): No   Physical Activity: Sufficiently Active (1/18/2024)    Exercise Vital Sign     Days of Exercise per Week: 7 days     Minutes of Exercise per Session: 30 min   Housing Stability: Unknown (10/27/2023)    Housing Stability Vital Sign     Unstable Housing in the Last Year: No         PHYSICAL EXAM    (up to 7 for level 4, 8 or more for level 5)       Physical Exam  Constitutional:       General: She is not in acute distress.     Appearance: She is well-developed.   HENT:      Head: Normocephalic and atraumatic.      Right Ear: There is no impacted cerumen. Tympanic membrane is not erythematous or bulging.      Left Ear: There is impacted cerumen. Tympanic membrane is not erythematous or bulging.      Ears:      Comments: Cerumen impaction removed by MA on left side   TM intact post procedure without immediate complications   Eyes:      Conjunctiva/sclera: Conjunctivae normal.      Pupils: Pupils are equal, round, and reactive to light.   Cardiovascular:      Rate and Rhythm: Normal rate and regular rhythm.      Heart sounds: No murmur heard.  Pulmonary:      Effort: No respiratory distress.      Breath sounds: Normal breath

## 2024-11-15 ENCOUNTER — OFFICE VISIT (OUTPATIENT)
Dept: FAMILY MEDICINE CLINIC | Age: 89
End: 2024-11-15

## 2024-11-15 VITALS
WEIGHT: 118 LBS | DIASTOLIC BLOOD PRESSURE: 82 MMHG | SYSTOLIC BLOOD PRESSURE: 136 MMHG | HEIGHT: 60 IN | OXYGEN SATURATION: 93 % | BODY MASS INDEX: 23.16 KG/M2 | TEMPERATURE: 97.5 F | HEART RATE: 112 BPM

## 2024-11-15 DIAGNOSIS — M25.532 LEFT WRIST PAIN: Primary | ICD-10-CM

## 2024-11-15 SDOH — ECONOMIC STABILITY: FOOD INSECURITY: WITHIN THE PAST 12 MONTHS, THE FOOD YOU BOUGHT JUST DIDN'T LAST AND YOU DIDN'T HAVE MONEY TO GET MORE.: NEVER TRUE

## 2024-11-15 SDOH — ECONOMIC STABILITY: FOOD INSECURITY: WITHIN THE PAST 12 MONTHS, YOU WORRIED THAT YOUR FOOD WOULD RUN OUT BEFORE YOU GOT MONEY TO BUY MORE.: NEVER TRUE

## 2024-11-15 SDOH — ECONOMIC STABILITY: INCOME INSECURITY: HOW HARD IS IT FOR YOU TO PAY FOR THE VERY BASICS LIKE FOOD, HOUSING, MEDICAL CARE, AND HEATING?: NOT HARD AT ALL

## 2024-11-15 ASSESSMENT — ENCOUNTER SYMPTOMS: COLOR CHANGE: 0

## 2024-11-15 NOTE — PROGRESS NOTES
Subjective:      Patient ID: Rachel Sarmiento is a 89 y.o. female who presents today for:  Chief Complaint   Patient presents with    Extremity Pain     Patient states that her left wrist hurts and its swollen compared to the other one.        HPI  Patient is here with left wrist pain for the last 2 days.  She is not sure about any injury.  Says she has had to take Tylenol today.  Past Medical History:   Diagnosis Date    Cataract     1/2/2007     Diabetes mellitus (HCC)     Esophageal stricture     6/29/2004  : SCHATZKI'S RING/STRICTURE    Hx of intestinal bypass     8/29/2000  : S/P RT HEMICOLECTOMY    Hyperlipidemia     Hypertension     Incarcerated inguinal hernia     5/7/2008  : Repair 2/29/08 in [facility].    Schatzki's ring      : W/ ESOPHAGEAL STRICTURE    Squamous cell carcinoma of scalp 09/01/2015     Past Surgical History:   Procedure Laterality Date    CHOLECYSTECTOMY      COLONOSCOPY      ENDOSCOPY, COLON, DIAGNOSTIC      EYE SURGERY Right     cataract    HERNIA REPAIR Right     HYSTERECTOMY (CERVIX STATUS UNKNOWN)      JOINT REPLACEMENT Right     knee replacement    LAPAROSCOPY SURGICAL W/ VAGINAL HYSTERECTOMY      RIGHT COLECTOMY      2003     UPPER GASTROINTESTINAL ENDOSCOPY N/A 12/20/2016    EGD ESOPHAGOGASTRODUODENOSCOPY WITH DILATION performed by Sushil Sow MD at [facility]    UPPER GASTROINTESTINAL ENDOSCOPY N/A 6/4/2020    EGD DIAGNOSTIC ONLY performed by Sushil Sow MD at [facility]     Social History     Socioeconomic History    Marital status:      Spouse name: Not on file    Number of children: Not on file    Years of education: Not on file    Highest education level: Not on file   Occupational History    Not on file   Tobacco Use    Smoking status: Never     Passive exposure: Never    Smokeless tobacco: Never   Substance and Sexual Activity    Alcohol use: No     Alcohol/week: 0.0 standard drinks of alcohol    Drug use: No    Sexual activity: Not on file   Other

## 2024-11-18 RX ORDER — RABEPRAZOLE SODIUM 20 MG/1
20 TABLET, DELAYED RELEASE ORAL DAILY
Qty: 90 TABLET | Refills: 0 | Status: SHIPPED | OUTPATIENT
Start: 2024-11-18

## 2024-11-26 RX ORDER — RABEPRAZOLE SODIUM 20 MG/1
20 TABLET, DELAYED RELEASE ORAL DAILY
Qty: 90 TABLET | Refills: 0 | Status: SHIPPED | OUTPATIENT
Start: 2024-11-26

## 2025-01-09 ENCOUNTER — OFFICE VISIT (OUTPATIENT)
Dept: GASTROENTEROLOGY | Age: 89
End: 2025-01-09

## 2025-01-09 VITALS — HEART RATE: 86 BPM | OXYGEN SATURATION: 97 % | BODY MASS INDEX: 23.05 KG/M2 | WEIGHT: 118 LBS

## 2025-01-09 DIAGNOSIS — K21.9 GASTROESOPHAGEAL REFLUX DISEASE WITHOUT ESOPHAGITIS: Primary | ICD-10-CM

## 2025-01-09 DIAGNOSIS — R19.7 DIARRHEA, UNSPECIFIED TYPE: ICD-10-CM

## 2025-01-09 PROCEDURE — 1123F ACP DISCUSS/DSCN MKR DOCD: CPT | Performed by: SPECIALIST

## 2025-01-09 PROCEDURE — 99213 OFFICE O/P EST LOW 20 MIN: CPT | Performed by: SPECIALIST

## 2025-01-09 RX ORDER — DIPHENOXYLATE HYDROCHLORIDE AND ATROPINE SULFATE 2.5; .025 MG/1; MG/1
TABLET ORAL
Qty: 20 TABLET | Refills: 1 | Status: SHIPPED | OUTPATIENT
Start: 2025-01-09 | End: 2025-03-05

## 2025-01-09 ASSESSMENT — ENCOUNTER SYMPTOMS
ABDOMINAL PAIN: 0
BLOOD IN STOOL: 0
ABDOMINAL DISTENTION: 0
GASTROINTESTINAL NEGATIVE: 1
RESPIRATORY NEGATIVE: 1
CONSTIPATION: 0
DIARRHEA: 0
RECTAL PAIN: 0
ANAL BLEEDING: 0
NAUSEA: 0
EYES NEGATIVE: 1
VOMITING: 0

## 2025-01-09 NOTE — PROGRESS NOTES
General: Normal range of motion.      Cervical back: Normal range of motion.   Skin:     General: Skin is warm.   Neurological:      Mental Status: She is alert.         Laboratory, Pathology, Radiology reviewed in detail with relevantimportant investigations summarized below:    No results for input(s): \"WBC\", \"HGB\", \"HCT\", \"MCV\", \"PLT\" in the last 720 hours.  Lab Results   Component Value Date    ALT 21 01/18/2024    AST 19 01/18/2024    ALKPHOS 147 (H) 01/18/2024    BILITOT <0.2 01/18/2024     No results found.    Endoscopic investigations: \    Assessment and Plan:  Rachel Sarmiento 89 y.o. female for follow up.  History of chronic GERD controlled with rabeprazole 20 mg once a day and also takes Pepcid 20 mg in the evening, takes Lomotil as needed for diarrhea      No follow-ups on file.    Sushil Sow MD   StaffGastroenterologist  [facility] ([place], [place])    Please note this report has been partially produced using speech recognitionsoftware  and may cause contain errors related to that system including grammar, punctuation and spelling as well as words andphrases that may seem inappropriate. If there are questions or concerns please feel free to contact me to clarify.

## 2025-03-27 ENCOUNTER — OFFICE VISIT (OUTPATIENT)
Age: 89
End: 2025-03-27
Payer: MEDICARE

## 2025-03-27 VITALS
TEMPERATURE: 97.5 F | HEART RATE: 94 BPM | HEIGHT: 60 IN | BODY MASS INDEX: 23.16 KG/M2 | WEIGHT: 118 LBS | OXYGEN SATURATION: 95 % | SYSTOLIC BLOOD PRESSURE: 138 MMHG | DIASTOLIC BLOOD PRESSURE: 72 MMHG

## 2025-03-27 DIAGNOSIS — R05.8 OTHER COUGH: ICD-10-CM

## 2025-03-27 DIAGNOSIS — R68.89 FLU-LIKE SYMPTOMS: ICD-10-CM

## 2025-03-27 DIAGNOSIS — J40 BRONCHITIS: Primary | ICD-10-CM

## 2025-03-27 LAB
INFLUENZA A ANTIGEN, POC: NEGATIVE
INFLUENZA B ANTIGEN, POC: NEGATIVE
Lab: NORMAL
QC PASS/FAIL: NORMAL
SARS-COV-2 RDRP RESP QL NAA+PROBE: NEGATIVE
VALID INTERNAL CONTROL, POC: NORMAL

## 2025-03-27 PROCEDURE — 99214 OFFICE O/P EST MOD 30 MIN: CPT

## 2025-03-27 PROCEDURE — 87635 SARS-COV-2 COVID-19 AMP PRB: CPT

## 2025-03-27 PROCEDURE — 1160F RVW MEDS BY RX/DR IN RCRD: CPT

## 2025-03-27 PROCEDURE — 1123F ACP DISCUSS/DSCN MKR DOCD: CPT

## 2025-03-27 PROCEDURE — 87502 INFLUENZA DNA AMP PROBE: CPT

## 2025-03-27 PROCEDURE — 1159F MED LIST DOCD IN RCRD: CPT

## 2025-03-27 RX ORDER — DOXYCYCLINE HYCLATE 100 MG
100 TABLET ORAL 2 TIMES DAILY
Qty: 20 TABLET | Refills: 0 | Status: SHIPPED | OUTPATIENT
Start: 2025-03-27 | End: 2025-04-06

## 2025-03-27 RX ORDER — PREDNISONE 20 MG/1
20 TABLET ORAL 2 TIMES DAILY
Qty: 10 TABLET | Refills: 0 | Status: SHIPPED | OUTPATIENT
Start: 2025-03-27 | End: 2025-04-01

## 2025-03-27 SDOH — ECONOMIC STABILITY: FOOD INSECURITY: WITHIN THE PAST 12 MONTHS, THE FOOD YOU BOUGHT JUST DIDN'T LAST AND YOU DIDN'T HAVE MONEY TO GET MORE.: NEVER TRUE

## 2025-03-27 SDOH — ECONOMIC STABILITY: FOOD INSECURITY: WITHIN THE PAST 12 MONTHS, YOU WORRIED THAT YOUR FOOD WOULD RUN OUT BEFORE YOU GOT MONEY TO BUY MORE.: NEVER TRUE

## 2025-03-27 ASSESSMENT — ENCOUNTER SYMPTOMS
FACIAL SWELLING: 0
ABDOMINAL PAIN: 0
WHEEZING: 0
COLOR CHANGE: 0
DIARRHEA: 0
SINUS PRESSURE: 0
SORE THROAT: 0
APNEA: 0
TROUBLE SWALLOWING: 0
CHEST TIGHTNESS: 0
COUGH: 1
BACK PAIN: 0
NAUSEA: 0
SINUS PAIN: 0
VOMITING: 0
SHORTNESS OF BREATH: 0
RHINORRHEA: 0

## 2025-03-27 ASSESSMENT — PATIENT HEALTH QUESTIONNAIRE - PHQ9
SUM OF ALL RESPONSES TO PHQ QUESTIONS 1-9: 0
SUM OF ALL RESPONSES TO PHQ QUESTIONS 1-9: 0
1. LITTLE INTEREST OR PLEASURE IN DOING THINGS: NOT AT ALL
SUM OF ALL RESPONSES TO PHQ QUESTIONS 1-9: 0
SUM OF ALL RESPONSES TO PHQ QUESTIONS 1-9: 0
2. FEELING DOWN, DEPRESSED OR HOPELESS: NOT AT ALL

## 2025-03-27 NOTE — PROGRESS NOTES
[facility] [place] [facility], [facility] WALK-IN [facility]  [address] [address] [phone]     Date of Visit:  3/27/2025  Patient Name: Rachel Sarmiento   Patient :  [id number]     CHIEF COMPLAINT:     Rachel Sarmiento is a 89 y.o. female who presents today to be evaluated for the following condition(s):  Chief Complaint   Patient presents with    Cold Symptoms     Patient presents for URI. X  5 days.        REVIEW OF SYSTEM      Review of Systems   Constitutional:  Positive for fatigue. Negative for activity change, appetite change, diaphoresis and fever.   HENT:  Positive for congestion and postnasal drip. Negative for drooling, ear discharge, ear pain, facial swelling, rhinorrhea, sinus pressure, sinus pain, sneezing, sore throat and trouble swallowing.    Respiratory:  Positive for cough. Negative for apnea, chest tightness, shortness of breath and wheezing.    Cardiovascular:  Negative for chest pain.   Gastrointestinal:  Negative for abdominal pain, diarrhea, nausea and vomiting.   Genitourinary:  Negative for difficulty urinating, dysuria, flank pain, frequency, hematuria and urgency.   Musculoskeletal:  Negative for arthralgias, back pain, gait problem, joint swelling, myalgias and neck stiffness.   Skin:  Negative for color change.   Allergic/Immunologic: Negative for immunocompromised state.   Neurological:  Negative for dizziness, light-headedness, numbness and headaches.   Hematological:  Negative for adenopathy.   Psychiatric/Behavioral:  Negative for agitation, behavioral problems and sleep disturbance. The patient is not nervous/anxious.          HISTORY OF PRESENT ILLNESS     Patient here for cough  Patient is not having any fever  Patients cough started 5 days ago states that in the morning she has a lot of phelm and took a nap this afternoon and had increased phlem  Patient is eating and drinking appropriate  Patient states cough is

## 2025-03-28 ENCOUNTER — RESULTS FOLLOW-UP (OUTPATIENT)
Dept: FAMILY MEDICINE CLINIC | Age: 89
End: 2025-03-28

## 2025-04-23 ENCOUNTER — OFFICE VISIT (OUTPATIENT)
Age: 89
End: 2025-04-23
Payer: MEDICARE

## 2025-04-23 VITALS — SYSTOLIC BLOOD PRESSURE: 116 MMHG | OXYGEN SATURATION: 93 % | DIASTOLIC BLOOD PRESSURE: 72 MMHG | HEART RATE: 94 BPM

## 2025-04-23 DIAGNOSIS — R05.9 COUGH, UNSPECIFIED TYPE: ICD-10-CM

## 2025-04-23 DIAGNOSIS — J20.9 ACUTE BRONCHITIS, UNSPECIFIED ORGANISM: Primary | ICD-10-CM

## 2025-04-23 DIAGNOSIS — J47.9 BRONCHIECTASIS WITHOUT COMPLICATION (HCC): ICD-10-CM

## 2025-04-23 PROCEDURE — 99204 OFFICE O/P NEW MOD 45 MIN: CPT | Performed by: INTERNAL MEDICINE

## 2025-04-23 PROCEDURE — 1123F ACP DISCUSS/DSCN MKR DOCD: CPT | Performed by: INTERNAL MEDICINE

## 2025-04-23 PROCEDURE — 1159F MED LIST DOCD IN RCRD: CPT | Performed by: INTERNAL MEDICINE

## 2025-04-23 RX ORDER — AZITHROMYCIN 250 MG/1
250 TABLET, FILM COATED ORAL SEE ADMIN INSTRUCTIONS
Qty: 6 TABLET | Refills: 1 | Status: SHIPPED | OUTPATIENT
Start: 2025-04-23 | End: 2025-04-28

## 2025-04-23 ASSESSMENT — ENCOUNTER SYMPTOMS
ABDOMINAL PAIN: 0
SHORTNESS OF BREATH: 0
VOMITING: 0
DIARRHEA: 0
NAUSEA: 0
TROUBLE SWALLOWING: 0
EYE DISCHARGE: 0
EYE ITCHING: 0
WHEEZING: 0
CHEST TIGHTNESS: 0
RHINORRHEA: 0
SORE THROAT: 0
VOICE CHANGE: 0
COUGH: 1
SINUS PRESSURE: 0

## 2025-04-23 NOTE — PROGRESS NOTES
Subjective:             Rachel Sarmiento is a 89 y.o. female who complains today of:     Chief Complaint   Patient presents with    New Patient     Self ref for chronic cough/SOB        HPI  She has cold 1 month ago , she  has c/o cough with mucus , chest congestion , voice changes. She was seen in walking clinic and she was told she has bronchitis , she was given tetracycline and prednisone .  States that she doxycycline but did not take prednisone.  She said she was initially coughing whole day but now she is feeling better and coughing much less. She coughing white  mucus.  She said chest x-ray was done.  She was told that she has COPD but she never had history of smoking or secondhand exposure.      At this she has no C/o shortness of breath  with exertion.   No Wheezing. No Hemoptysis.No Chest tightness.   No Chest pain with radiation  or pleuritic pain.  No  leg edema. No orthopnea.No Fever or chills.   No Rhinorrhea and postnasal drip.    CXR show COPD and bronchiectasis , no acute process     Allergies:  Atenolol, Lisinopril, Lovastatin, Naproxen, and Atorvastatin  Past Medical History:   Diagnosis Date    Cataract     1/2/2007     Diabetes mellitus (HCC)     Esophageal stricture     6/29/2004  : SCHATZKI'S RING/STRICTURE    Hx of intestinal bypass     8/29/2000  : S/P RT HEMICOLECTOMY    Hyperlipidemia     Hypertension     Incarcerated inguinal hernia     5/7/2008  : Repair 2/29/08 in [facility].    Schatzki's ring      : W/ ESOPHAGEAL STRICTURE    Squamous cell carcinoma of scalp 09/01/2015     Past Surgical History:   Procedure Laterality Date    CHOLECYSTECTOMY      COLONOSCOPY      ENDOSCOPY, COLON, DIAGNOSTIC      EYE SURGERY Right     cataract    HERNIA REPAIR Right     HYSTERECTOMY (CERVIX STATUS UNKNOWN)      JOINT REPLACEMENT Right     knee replacement    LAPAROSCOPY SURGICAL W/ VAGINAL HYSTERECTOMY      RIGHT COLECTOMY      2003     UPPER GASTROINTESTINAL ENDOSCOPY N/A 12/20/2016    EGD

## 2025-05-01 ENCOUNTER — RESULTS FOLLOW-UP (OUTPATIENT)
Age: 89
End: 2025-05-01

## 2025-05-01 ENCOUNTER — OFFICE VISIT (OUTPATIENT)
Age: 89
End: 2025-05-01
Payer: MEDICARE

## 2025-05-01 VITALS
SYSTOLIC BLOOD PRESSURE: 136 MMHG | OXYGEN SATURATION: 95 % | BODY MASS INDEX: 23.52 KG/M2 | TEMPERATURE: 97.8 F | DIASTOLIC BLOOD PRESSURE: 80 MMHG | WEIGHT: 119.8 LBS | HEART RATE: 119 BPM | HEIGHT: 60 IN

## 2025-05-01 DIAGNOSIS — M54.50 LUMBAR PAIN: Primary | ICD-10-CM

## 2025-05-01 DIAGNOSIS — M53.3 SACROILIAC JOINT PAIN: ICD-10-CM

## 2025-05-01 DIAGNOSIS — S22.070A COMPRESSION FRACTURE OF T9 VERTEBRA, INITIAL ENCOUNTER (HCC): ICD-10-CM

## 2025-05-01 DIAGNOSIS — M51.360 DEGENERATION OF INTERVERTEBRAL DISC OF LUMBAR REGION WITH DISCOGENIC BACK PAIN: ICD-10-CM

## 2025-05-01 DIAGNOSIS — S32.030A COMPRESSION FRACTURE OF L3 VERTEBRA, INITIAL ENCOUNTER (HCC): ICD-10-CM

## 2025-05-01 PROCEDURE — 99213 OFFICE O/P EST LOW 20 MIN: CPT | Performed by: NURSE PRACTITIONER

## 2025-05-01 PROCEDURE — 1123F ACP DISCUSS/DSCN MKR DOCD: CPT | Performed by: NURSE PRACTITIONER

## 2025-05-01 PROCEDURE — 1159F MED LIST DOCD IN RCRD: CPT | Performed by: NURSE PRACTITIONER

## 2025-05-01 ASSESSMENT — ENCOUNTER SYMPTOMS
COUGH: 0
SHORTNESS OF BREATH: 0
WHEEZING: 0
ABDOMINAL PAIN: 0
SORE THROAT: 0
BACK PAIN: 1
NAUSEA: 0
EYE REDNESS: 0
DIARRHEA: 0
EYE DISCHARGE: 0
EYE ITCHING: 0
CHEST TIGHTNESS: 0
EYE PAIN: 0
STRIDOR: 0
VOMITING: 0
PHOTOPHOBIA: 0

## 2025-05-01 NOTE — PROGRESS NOTES
Subjective:      Patient ID: Rachel Sarmiento is a 89 y.o. female who presents today for:  Chief Complaint   Patient presents with    Back Pain     Patient c/o back pain since Monday. Patient has been taking Tylenol. Patient states she twisted back in past, but unsure if recent injury. Patient states pain located in lower back. Patient denies urinary concerns.        HPI  Patient is here with c/o low back pain.  Says it is over the SI joints as well.  Started a few days ago.  Says she lifted some packages and she was moving around a lot.Says she may have twisted it.  Says she has to grab the car to keep from falling.   Says she is using Tylenol for the pain.   Says she went on her errands while it was painful and noticed some worsening after.   Says it is worse at night when moving around in bed.   Past Medical History:   Diagnosis Date    Cataract     1/2/2007     Diabetes mellitus (HCC)     Esophageal stricture     6/29/2004  : SCHATZKI'S RING/STRICTURE    Hx of intestinal bypass     8/29/2000  : S/P RT HEMICOLECTOMY    Hyperlipidemia     Hypertension     Incarcerated inguinal hernia     5/7/2008  : Repair 2/29/08 in [facility].    Schatzki's ring      : W/ ESOPHAGEAL STRICTURE    Squamous cell carcinoma of scalp 09/01/2015     Past Surgical History:   Procedure Laterality Date    CHOLECYSTECTOMY      COLONOSCOPY      ENDOSCOPY, COLON, DIAGNOSTIC      EYE SURGERY Right     cataract    HERNIA REPAIR Right     HYSTERECTOMY (CERVIX STATUS UNKNOWN)      JOINT REPLACEMENT Right     knee replacement    LAPAROSCOPY SURGICAL W/ VAGINAL HYSTERECTOMY      RIGHT COLECTOMY      2003     UPPER GASTROINTESTINAL ENDOSCOPY N/A 12/20/2016    EGD ESOPHAGOGASTRODUODENOSCOPY WITH DILATION performed by Sushil Sow MD at [facility]    UPPER GASTROINTESTINAL ENDOSCOPY N/A 6/4/2020    EGD DIAGNOSTIC ONLY performed by Sushil Sow MD at [facility]     Social History     Socioeconomic History    Marital status:

## 2025-05-01 NOTE — RESULT ENCOUNTER NOTE
Please advise patient that XR of the lower spine is showing arthritis and degenerative disc disease.   She also has 2 compression fractures. Unsure if these are new.   Will send a referral to Ortho spine .

## 2025-05-07 ENCOUNTER — OFFICE VISIT (OUTPATIENT)
Age: 89
End: 2025-05-07
Payer: MEDICARE

## 2025-05-07 VITALS
OXYGEN SATURATION: 96 % | WEIGHT: 115 LBS | SYSTOLIC BLOOD PRESSURE: 112 MMHG | BODY MASS INDEX: 22.46 KG/M2 | HEART RATE: 84 BPM | DIASTOLIC BLOOD PRESSURE: 80 MMHG

## 2025-05-07 DIAGNOSIS — J47.9 BRONCHIECTASIS WITHOUT COMPLICATION (HCC): ICD-10-CM

## 2025-05-07 DIAGNOSIS — R05.9 COUGH, UNSPECIFIED TYPE: ICD-10-CM

## 2025-05-07 DIAGNOSIS — J20.9 ACUTE BRONCHITIS, UNSPECIFIED ORGANISM: Primary | ICD-10-CM

## 2025-05-07 PROCEDURE — 1123F ACP DISCUSS/DSCN MKR DOCD: CPT | Performed by: INTERNAL MEDICINE

## 2025-05-07 PROCEDURE — 99213 OFFICE O/P EST LOW 20 MIN: CPT | Performed by: INTERNAL MEDICINE

## 2025-05-07 NOTE — PROGRESS NOTES
Subjective:             Rachel Sarmiento is a 89 y.o. female who complains today of:     Chief Complaint   Patient presents with    Follow-up     2wk f/u on acute bronchitis, cough        HPI  She said her cough got better after last visit , No cough.   She did not go for CXR  No C/o shortness of breath  with exertion.   No Wheezing. No Hemoptysis.No Chest tightness.   No Chest pain with radiation  or pleuritic pain.  No  leg edema. No orthopnea.No Fever or chills.   No Rhinorrhea and postnasal drip.     CXR 3/27/25   Show COPD and bronchiectasis , no acute process.    Allergies:  Atenolol, Lisinopril, Lovastatin, Naproxen, and Atorvastatin  Past Medical History:   Diagnosis Date    Cataract     1/2/2007     Diabetes mellitus (HCC)     Esophageal stricture     6/29/2004  : SCHATZKI'S RING/STRICTURE    Hx of intestinal bypass     8/29/2000  : S/P RT HEMICOLECTOMY    Hyperlipidemia     Hypertension     Incarcerated inguinal hernia     5/7/2008  : Repair 2/29/08 in [facility].    Schatzki's ring      : W/ ESOPHAGEAL STRICTURE    Squamous cell carcinoma of scalp 09/01/2015     Past Surgical History:   Procedure Laterality Date    CHOLECYSTECTOMY      COLONOSCOPY      ENDOSCOPY, COLON, DIAGNOSTIC      EYE SURGERY Right     cataract    HERNIA REPAIR Right     HYSTERECTOMY (CERVIX STATUS UNKNOWN)      JOINT REPLACEMENT Right     knee replacement    LAPAROSCOPY SURGICAL W/ VAGINAL HYSTERECTOMY      RIGHT COLECTOMY      2003     UPPER GASTROINTESTINAL ENDOSCOPY N/A 12/20/2016    EGD ESOPHAGOGASTRODUODENOSCOPY WITH DILATION performed by Sushil Sow MD at [facility]    UPPER GASTROINTESTINAL ENDOSCOPY N/A 6/4/2020    EGD DIAGNOSTIC ONLY performed by Sushil Sow MD at [facility]     Family History   Problem Relation Age of Onset    Pancreatic Cancer Mother      Social History     Socioeconomic History    Marital status:      Spouse name: Not on file    Number of children: Not on file    Years of

## 2025-05-08 ENCOUNTER — OFFICE VISIT (OUTPATIENT)
Age: 89
End: 2025-05-08
Payer: MEDICARE

## 2025-05-08 VITALS
HEIGHT: 60 IN | TEMPERATURE: 96.9 F | OXYGEN SATURATION: 93 % | BODY MASS INDEX: 22.58 KG/M2 | HEART RATE: 59 BPM | WEIGHT: 115 LBS

## 2025-05-08 DIAGNOSIS — M81.0 AGE RELATED OSTEOPOROSIS, UNSPECIFIED PATHOLOGICAL FRACTURE PRESENCE: Primary | ICD-10-CM

## 2025-05-08 DIAGNOSIS — M43.16 SPONDYLOLISTHESIS OF LUMBAR REGION: ICD-10-CM

## 2025-05-08 PROCEDURE — 1159F MED LIST DOCD IN RCRD: CPT | Performed by: ORTHOPAEDIC SURGERY

## 2025-05-08 PROCEDURE — 99204 OFFICE O/P NEW MOD 45 MIN: CPT | Performed by: ORTHOPAEDIC SURGERY

## 2025-05-08 PROCEDURE — 1123F ACP DISCUSS/DSCN MKR DOCD: CPT | Performed by: ORTHOPAEDIC SURGERY

## 2025-05-08 PROCEDURE — 1125F AMNT PAIN NOTED PAIN PRSNT: CPT | Performed by: ORTHOPAEDIC SURGERY

## 2025-05-08 RX ORDER — METHYLPREDNISOLONE 4 MG/1
TABLET ORAL
Qty: 21 TABLET | Refills: 0 | Status: SHIPPED | OUTPATIENT
Start: 2025-05-08 | End: 2025-06-30

## 2025-05-08 NOTE — PROGRESS NOTES
Subjective:      Patient ID: Rachel Sarmiento is a 89 y.o. female who presents today for:  Chief Complaint   Patient presents with    Back Pain     Patient is here for lower back pain, had a fall. Xray completed 5/1/25.       Subjective/Objective/Assessment/Plan:     SUBJECTIVE -patient has complaints of low back pain at the lumbosacral junction.  She had a fall roughly a week and a half ago.  She has had some prednisone but she for and thinks it was too strong for her.    OBJECTIVE -walking.    ASSESSMENT    Diagnosis Orders   1. Age related osteoporosis, unspecified pathological fracture presence  DEXA BONE DENSITY AXIAL SKELETON    DEXA BONE DENSITY PERIPHERAL    MRI LUMBAR SPINE WO CONTRAST      2. Spondylolisthesis of lumbar region            PLAN -I want her to take 5 mg or 4 mg of prednisone daily for 2 weeks until she comes back in to see me in 2 weeks.  I talked about her x-ray and what that shows.  She has L4-5 spondylolisthesis.  Difficult to determine if she is got an L5-S1 disc space which I believe she does.  Possible compression fracture at L3.  Severe degenerative disc disease at T12-L1 and L1-L2.  DISH appearing spine.  I would like to see her back in 2 weeks and we will address the DEXA scan and the MRI of her spine again.      XR LUMBAR SPINE (2-3 VIEWS)  Narrative: EXAMINATION:  3 XRAY VIEWS OF THE LUMBAR SPINE    5/1/2025 1:40 pm    COMPARISON:  None.    HISTORY:  ORDERING SYSTEM PROVIDED HISTORY: Lumbar pain, Sacroiliac joint pain  TECHNOLOGIST PROVIDED HISTORY:  Reason for exam:->lumbar or SI joint pain  What reading provider will be dictating this exam?->CRC    FINDINGS:  There is mild scoliosis of the upper lumbar spine convex towards the left.    There is grade 1 anterior subluxation of L4 on L5 with mild L4-5 disc  degenerative disease.    There is grade 1 posterior subluxation of L1 on L2 with prominent L1-2 disc  degenerative disease.    The rest of the lumbar vertebral bodies are in

## 2025-05-12 ENCOUNTER — TELEPHONE (OUTPATIENT)
Age: 89
End: 2025-05-12

## 2025-05-13 NOTE — TELEPHONE ENCOUNTER
Pt called and stated she had diarrhea due to completing the Prednisone. Pt stated she was going to start taking the Medrol Dose Pack but unsure because she had diarrhea with the other medication. She states the prednisone did help her because she is moving better. Advised pt, to take the medication as prescribed and if she has any issues with it call us back.

## 2025-05-16 ENCOUNTER — TELEPHONE (OUTPATIENT)
Age: 89
End: 2025-05-16

## 2025-05-16 NOTE — TELEPHONE ENCOUNTER
Called all pt phone numbers in chart. LM on all 3 numbers inquiring if the MRI and Bone Density has been completed. Awaiting for return call to let us know. Appointment will be cancelled if not until tests are completed.

## 2025-05-20 RX ORDER — FAMOTIDINE 20 MG/1
20 TABLET, FILM COATED ORAL NIGHTLY
Qty: 90 TABLET | Refills: 0 | Status: SHIPPED | OUTPATIENT
Start: 2025-05-20

## 2025-05-23 ENCOUNTER — OFFICE VISIT (OUTPATIENT)
Age: 89
End: 2025-05-23
Payer: MEDICARE

## 2025-05-23 VITALS
HEART RATE: 86 BPM | WEIGHT: 115 LBS | HEIGHT: 60 IN | TEMPERATURE: 97.1 F | SYSTOLIC BLOOD PRESSURE: 114 MMHG | BODY MASS INDEX: 22.58 KG/M2 | DIASTOLIC BLOOD PRESSURE: 72 MMHG | OXYGEN SATURATION: 96 %

## 2025-05-23 DIAGNOSIS — M81.0 AGE RELATED OSTEOPOROSIS, UNSPECIFIED PATHOLOGICAL FRACTURE PRESENCE: Primary | ICD-10-CM

## 2025-05-23 DIAGNOSIS — M47.816 FACET ARTHROPATHY, LUMBAR: ICD-10-CM

## 2025-05-23 DIAGNOSIS — M43.16 SPONDYLOLISTHESIS OF LUMBAR REGION: ICD-10-CM

## 2025-05-23 PROCEDURE — 99213 OFFICE O/P EST LOW 20 MIN: CPT | Performed by: ORTHOPAEDIC SURGERY

## 2025-05-23 PROCEDURE — 1123F ACP DISCUSS/DSCN MKR DOCD: CPT | Performed by: ORTHOPAEDIC SURGERY

## 2025-05-23 PROCEDURE — 1159F MED LIST DOCD IN RCRD: CPT | Performed by: ORTHOPAEDIC SURGERY

## 2025-05-23 NOTE — PROGRESS NOTES
Subjective:      Patient ID: Rachel Sarmiento is a 89 y.o. female who presents today for:  Chief Complaint   Patient presents with    Follow-up      FOLLOW UP SPINE - Lumbar Spine  Patient feels she is getting better       Subjective/Objective/Assessment/Plan:     SUBJECTIVE -patient has complaints of low back pain at the lumbosacral junction.  She had a fall roughly a week and a half ago.  She has had some prednisone but she for and thinks it was too strong for her.    OBJECTIVE -she has good range of motion with forward flexion.  She does have stiffness though upon rising.  Pain with lumbar extension.  Pain that radiates into her legs occasionally.    ASSESSMENT    Diagnosis Orders   1. Age related osteoporosis, unspecified pathological fracture presence        2. Spondylolisthesis of lumbar region        3. Facet arthropathy, lumbar              PLAN -she was unable to continue with the prednisone because she had nausea and vomiting after taking it for 3 days.  I talked about her x-ray and what that shows.  Severe L5-S1 facet arthritis.  She also has facet and facet arthritis at the above levels as well.  She would need an MRI to further delineate this.  She has L4-5 spondylolisthesis.  Difficult to determine if she is got an L5-S1 disc space which I believe she does.  Possible compression fracture at L3.  Severe degenerative disc disease at T12-L1 and L1-L2.  DISH appearing spine.  I would like to see her back in 1 month and we will address the DEXA scan and the MRI of her spine again.      XR LUMBAR SPINE (2-3 VIEWS)  Narrative: EXAMINATION:  3 XRAY VIEWS OF THE LUMBAR SPINE    5/1/2025 1:40 pm    COMPARISON:  None.    HISTORY:  ORDERING SYSTEM PROVIDED HISTORY: Lumbar pain, Sacroiliac joint pain  TECHNOLOGIST PROVIDED HISTORY:  Reason for exam:->lumbar or SI joint pain  What reading provider will be dictating this exam?->CRC    FINDINGS:  There is mild scoliosis of the upper lumbar spine convex towards the

## 2025-06-14 RX ORDER — RABEPRAZOLE SODIUM 20 MG/1
20 TABLET, DELAYED RELEASE ORAL DAILY
Qty: 90 TABLET | Refills: 0 | Status: SHIPPED | OUTPATIENT
Start: 2025-06-14

## 2025-06-30 ENCOUNTER — OFFICE VISIT (OUTPATIENT)
Age: 89
End: 2025-06-30
Payer: MEDICARE

## 2025-06-30 VITALS
OXYGEN SATURATION: 93 % | HEIGHT: 60 IN | DIASTOLIC BLOOD PRESSURE: 78 MMHG | BODY MASS INDEX: 22.58 KG/M2 | TEMPERATURE: 96.9 F | HEART RATE: 83 BPM | WEIGHT: 115 LBS | SYSTOLIC BLOOD PRESSURE: 148 MMHG | RESPIRATION RATE: 16 BRPM

## 2025-06-30 DIAGNOSIS — M43.16 SPONDYLOLISTHESIS OF LUMBAR REGION: ICD-10-CM

## 2025-06-30 DIAGNOSIS — M81.0 AGE RELATED OSTEOPOROSIS, UNSPECIFIED PATHOLOGICAL FRACTURE PRESENCE: Primary | ICD-10-CM

## 2025-06-30 PROCEDURE — 1123F ACP DISCUSS/DSCN MKR DOCD: CPT | Performed by: ORTHOPAEDIC SURGERY

## 2025-06-30 PROCEDURE — 1126F AMNT PAIN NOTED NONE PRSNT: CPT | Performed by: ORTHOPAEDIC SURGERY

## 2025-06-30 PROCEDURE — 1159F MED LIST DOCD IN RCRD: CPT | Performed by: ORTHOPAEDIC SURGERY

## 2025-06-30 PROCEDURE — 99214 OFFICE O/P EST MOD 30 MIN: CPT | Performed by: ORTHOPAEDIC SURGERY

## 2025-06-30 NOTE — PROGRESS NOTES
pain  TECHNOLOGIST PROVIDED HISTORY:  Reason for exam:->lumbar or SI joint pain  What reading provider will be dictating this exam?->CRC    FINDINGS:  There is mild scoliosis of the upper lumbar spine convex towards the left.    There is grade 1 anterior subluxation of L4 on L5 with mild L4-5 disc  degenerative disease.    There is grade 1 posterior subluxation of L1 on L2 with prominent L1-2 disc  degenerative disease.    The rest of the lumbar vertebral bodies are in anatomic alignment.    There is a mild L3 compression fracture with moderate superior endplate  fracture, fractures of indeterminate age.    The rest of the lumbar vertebral bodies are normal in height with no sign of  additional for endplate or compression fracture.    There is mild anterior wedging of the T9 through T11 vertebral bodies, mild  compression fractures of indeterminate age.    There is moderate L5-S1 disc degenerative disease.    The L3-4 disc is normal in height.    The visualized bony pelvis is intact with no sign of fracture or dislocation.  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    There appears to be mild primary osteoarthritis of the right hip.  Impression: 1. Mild L3 compression fracture with moderate superior endplate fracture,  fractures of indeterminate age.  2. Mild anterior wedging of the T9 through T11 vertebral bodies, mild  compression fractures of indeterminate age.  3. Grade 1 anterior subluxation of L4 on L5 with mild L4-5 disc degenerative  disease.  4. Grade 1 posterior subluxation of L1 on L2 with prominent L1-2 disc  degenerative disease.       --------------------------------------------------------------------------------------------------------------  Past Medical History:   Diagnosis Date    Cataract     1/2/2007     Diabetes mellitus (HCC)     Esophageal stricture     6/29/2004  : SCHATZKI'S RING/STRICTURE    Hx of intestinal bypass

## 2025-07-14 ENCOUNTER — OFFICE VISIT (OUTPATIENT)
Dept: GASTROENTEROLOGY | Age: 89
End: 2025-07-14
Payer: MEDICARE

## 2025-07-14 VITALS
DIASTOLIC BLOOD PRESSURE: 63 MMHG | BODY MASS INDEX: 23.16 KG/M2 | HEIGHT: 60 IN | SYSTOLIC BLOOD PRESSURE: 138 MMHG | WEIGHT: 118 LBS | HEART RATE: 73 BPM | OXYGEN SATURATION: 95 %

## 2025-07-14 DIAGNOSIS — R13.19 ESOPHAGEAL DYSPHAGIA: Primary | ICD-10-CM

## 2025-07-14 PROCEDURE — 99213 OFFICE O/P EST LOW 20 MIN: CPT | Performed by: SPECIALIST

## 2025-07-14 PROCEDURE — 1159F MED LIST DOCD IN RCRD: CPT | Performed by: SPECIALIST

## 2025-07-14 PROCEDURE — 1123F ACP DISCUSS/DSCN MKR DOCD: CPT | Performed by: SPECIALIST

## 2025-07-14 ASSESSMENT — ENCOUNTER SYMPTOMS
NAUSEA: 0
GASTROINTESTINAL NEGATIVE: 1
RECTAL PAIN: 0
CONSTIPATION: 0
RESPIRATORY NEGATIVE: 1
DIARRHEA: 0
ANAL BLEEDING: 0
ABDOMINAL DISTENTION: 0
ABDOMINAL PAIN: 0
EYES NEGATIVE: 1
VOMITING: 0
BLOOD IN STOOL: 0

## 2025-07-14 NOTE — PROGRESS NOTES
Gastroenterology Clinic Follow up Visit    Rachel Sarmiento  [id number]  Chief Complaint   Patient presents with    Follow-up     6 month f/u       HPI and A/P at last visit summarized below:  Patient is here for follow-up.,  She has history of chronic GERD and has been on rabeprazole and famotidine, reports having occasional dysphagia and discomfort in the throat.  No history of GI bleed, last EGD was in June 2020 which showed a hiatal hernia and nonobstructive Schatzki's ring    Review of Systems   Constitutional: Negative.    HENT: Negative.     Eyes: Negative.    Respiratory: Negative.     Cardiovascular: Negative.         Hypertension   Gastrointestinal: Negative.  Negative for abdominal distention, abdominal pain, anal bleeding, blood in stool, constipation, diarrhea, nausea, rectal pain and vomiting.        Occasional dysphagia   Endocrine: Negative.    Genitourinary: Negative.    Musculoskeletal: Negative.         History of gout   Skin: Negative.    Neurological: Negative.    Hematological: Negative.    Psychiatric/Behavioral: Negative.          Past medical history, past surgical history, medication list, social and familyhistory reviewed    Blood pressure 138/63, pulse 73, height 1.524 m (5'), weight 53.5 kg (118 lb), SpO2 95%, not currently breastfeeding.    Physical Exam  Constitutional:       Appearance: She is well-developed.   HENT:      Head: Normocephalic and atraumatic.   Eyes:      Conjunctiva/sclera: Conjunctivae normal.      Pupils: Pupils are equal, round, and reactive to light.   Cardiovascular:      Rate and Rhythm: Normal rate.      Comments: S1-S2 regular with a midsystolic murmur  Pulmonary:      Effort: Pulmonary effort is normal.   Abdominal:      General: Bowel sounds are normal.      Palpations: Abdomen is soft.   Musculoskeletal:         General: Normal range of motion.      Cervical back: Normal range of motion.      Comments: 1+ bilateral pitting edema .   Skin:     General: Skin is

## (undated) DEVICE — 4-PORT MANIFOLD: Brand: NEPTUNE 2

## (undated) DEVICE — ADAPTER FLSH PMP FLD MGMT GI IRRIG OFP 2 DISPOSABLE

## (undated) DEVICE — CONMED SCOPE SAVER BITE BLOCK, 20X27 MM: Brand: SCOPE SAVER

## (undated) DEVICE — BRUSH ENDO CLN L90.5IN SHTH DIA1.7MM BRIST DIA5-7MM 2-6MM

## (undated) DEVICE — ENDO CARRY-ON PROCEDURE KIT: Brand: ENDO CARRY-ON PROCEDURE KIT

## (undated) DEVICE — TUBE SET 96 MM 64 MM H2O PERISTALTIC STD AUX CHANNEL

## (undated) DEVICE — Device: Brand: ENDO SMARTCAP

## (undated) DEVICE — GLOVE SURG SZ 85 STD WHT LTX SYN POLYMER BEAD REINF ANTI RL